# Patient Record
Sex: FEMALE | Race: OTHER | URBAN - METROPOLITAN AREA
[De-identification: names, ages, dates, MRNs, and addresses within clinical notes are randomized per-mention and may not be internally consistent; named-entity substitution may affect disease eponyms.]

---

## 2019-09-23 ENCOUNTER — OFFICE VISIT (OUTPATIENT)
Dept: GYNECOLOGY | Facility: HOSPITAL | Age: 34
End: 2019-09-23
Attending: OBSTETRICS & GYNECOLOGY
Payer: COMMERCIAL

## 2019-09-23 VITALS
DIASTOLIC BLOOD PRESSURE: 80 MMHG | HEIGHT: 63 IN | WEIGHT: 204.4 LBS | RESPIRATION RATE: 18 BRPM | HEART RATE: 82 BPM | SYSTOLIC BLOOD PRESSURE: 142 MMHG | BODY MASS INDEX: 36.21 KG/M2

## 2019-09-23 DIAGNOSIS — Z11.3 SCREENING EXAMINATION FOR SEXUALLY TRANSMITTED DISEASE: ICD-10-CM

## 2019-09-23 DIAGNOSIS — Z01.411 ENCOUNTER FOR GYNECOLOGICAL EXAMINATION (GENERAL) (ROUTINE) WITH ABNORMAL FINDINGS: Primary | ICD-10-CM

## 2019-09-23 DIAGNOSIS — Z30.013 INITIATION OF DEPO PROVERA: ICD-10-CM

## 2019-09-23 LAB
B-HCG UR QL: NEGATIVE
POCT TEST: NORMAL

## 2019-09-23 PROCEDURE — G0463 HOSPITAL OUTPT CLINIC VISIT: HCPCS | Mod: 25,FP | Performed by: ADVANCED PRACTICE MIDWIFE

## 2019-09-23 PROCEDURE — 81025 URINE PREGNANCY TEST: CPT | Performed by: OBSTETRICS & GYNECOLOGY

## 2019-09-23 PROCEDURE — 96372 THER/PROPH/DIAG INJ SC/IM: CPT | Performed by: ADVANCED PRACTICE MIDWIFE

## 2019-09-23 PROCEDURE — G0463 HOSPITAL OUTPT CLINIC VISIT: HCPCS

## 2019-09-23 PROCEDURE — 96372 THER/PROPH/DIAG INJ SC/IM: CPT | Performed by: OBSTETRICS & GYNECOLOGY

## 2019-09-23 PROCEDURE — 3E023GC INTRODUCTION OF OTHER THERAPEUTIC SUBSTANCE INTO MUSCLE, PERCUTANEOUS APPROACH: ICD-10-PCS | Performed by: ADVANCED PRACTICE MIDWIFE

## 2019-09-23 PROCEDURE — G0463 HOSPITAL OUTPT CLINIC VISIT: HCPCS | Mod: 25 | Performed by: ADVANCED PRACTICE MIDWIFE

## 2019-09-23 PROCEDURE — 8E0UXY7 EXAMINATION OF FEMALE REPRODUCTIVE SYSTEM: ICD-10-PCS | Performed by: ADVANCED PRACTICE MIDWIFE

## 2019-09-23 RX ORDER — FLUTICASONE PROPIONATE 50 MCG
1 SPRAY, SUSPENSION (ML) NASAL DAILY PRN
COMMUNITY
Start: 2018-09-28

## 2019-09-23 RX ORDER — ALBUTEROL SULFATE 90 UG/1
2 INHALANT RESPIRATORY (INHALATION) AS NEEDED
COMMUNITY
Start: 2019-02-27

## 2019-09-23 RX ORDER — MEDROXYPROGESTERONE ACETATE 150 MG/ML
150 INJECTION, SUSPENSION INTRAMUSCULAR ONCE
Status: COMPLETED | OUTPATIENT
Start: 2019-09-23 | End: 2019-09-23

## 2019-09-23 RX ORDER — SUMATRIPTAN SUCCINATE 50 MG/1
50 TABLET ORAL ONCE AS NEEDED
COMMUNITY
Start: 2019-01-16

## 2019-09-23 RX ORDER — ONDANSETRON 4 MG/1
4 TABLET, ORALLY DISINTEGRATING ORAL AS NEEDED
COMMUNITY
Start: 2019-01-16 | End: 2022-03-21 | Stop reason: ALTCHOICE

## 2019-09-23 RX ADMIN — MEDROXYPROGESTERONE ACETATE 150 MG: 150 INJECTION, SUSPENSION INTRAMUSCULAR at 15:32

## 2019-09-23 NOTE — PROGRESS NOTES
34 y.o. yo   here for annual visit and STD testing.  Not sexually active for past month and would like to start Depo, UPT today is negative.Currently exercising twp times weekly.  Admists to feeling very stressed, has very little support and is unemployed, also has a history of housing insecurity.  Agrees to speak with SW today.    Obstetric History:   OB History    Para Term  AB Living   9       4 5   SAB TAB Ectopic Multiple Live Births   2 2            # Outcome Date GA Lbr Moy/2nd Weight Sex Delivery Anes PTL Lv   9             8             7             6             5             4 SAB            3 SAB            2 TAB            1 TAB                 Sexualy History:   History   Sexual Activity   • Sexual activity: Not Currently     Menstrual History: GYN Status:  Last Menstrual Period: N/A  Menstrual Status: N/A  LMP Comment: N/A  Menarche Age:        Past Medical History:  has a past medical history of Asthma; Migraines; and Seasonal allergies.  Past Surgical History:  has a past surgical history that includes laparotomy exploratory ().  Family History: family history includes COPD in her biological father; Diabetes in her biological mother; Heart disease in her biological father.  Social History:  reports that she has been smoking.  She has never used smokeless tobacco. She reports that she drinks about 1.8 oz of alcohol per week . She reports that she uses drugs, including Marijuana.  Medications:   Current Outpatient Prescriptions:   •  albuterol HFA (PROVENTIL HFA) 90 mcg/actuation inhaler, Inhale 2 puffs., Disp: , Rfl:   •  cetirizine (ZyrTEC) 10 mg capsule, Take 10 mg by mouth daily., Disp: , Rfl:   •  fluticasone propionate (FLONASE) 50 mcg/actuation nasal spray, 1 spray daily., Disp: , Rfl:   •  ondansetron ODT (ZOFRAN ODT) 4 mg disintegrating tablet, Take 4 mg by mouth., Disp: , Rfl:   •  SUMAtriptan (IMITREX) 50 mg tablet, Take one tab  PO at onset of headache. Repeat dose in 2 hours if headache not resolved, Disp: , Rfl:     Current Facility-Administered Medications:   •  medroxyPROGESTERone (DEPO-PROVERA) injection 150 mg, 150 mg, intramuscular, Once, Debby Saunders CNM    Allergies: is allergic to latex.     Review of Systems  Constitutional: Denies chills and fever.   GI:   Denies abdomen pain, distention or change in bowel habits.  :   Denies dyspareunia, dysuria, pain, vag bleeding or discharge.   Breast:   Denies breast discharge, breast mass and breast pain.       Physical Exam   Constitutional:  Well developed.   Head:    Normocephalic.   Neck:   Thyroid normal, normal palpation.  Cardiovascular:  Normal rate and regular rhythm.    Pulmonary/Chest:  Breath sounds normal bilaterally.  Abdominal:   Soft, non-tender.   Breast Exam:  Inspection, palpation and nipples normal, bilaterally.    Ext Gen:   Normal hair distribution, normal urethra, clitoris and labia.  Vagina:  Normal orifice, pink epithelium, physiologic secretions.  Healed folliculitis scars.  Cervix:   Mispositioned, os closed.  Uterus:  Normal.  Adnexa:   Non tender.  Rectal:   Deferred.  Extremities:  No edema.     Impression:  Normal well-woman visit.     STD testing.     Depo start     Stress    Plan:  · Healthy lifestyle encouraged including SBE and regular exercise.  · Encouarged to cut back on toxic habits such as smoking and drinking.  · GC/CT collected today, to lab for HIV and RPR.   · Pap collected today.  · Depo administered by RN.  · RTO in three months for next Depo.

## 2019-09-23 NOTE — PROGRESS NOTES
09/23/2019 1535: SW met with patient during obgyn visit. Patient stated she is very stressed. Patient stated she  Has a 16, 12, 10, 8 and 3 year old. Patient stated she has had much difficulty caring for children as they has been involved in legal issues. Patient stated her 12 year old is repeatedly in trouble at school. Patient stated 10 year old keeps running away from home and has been in and out of assisted centers. Patient stated her 8 year olds FOB is now incarcerated again and she is supported at all. Patient stated she worked at [employer] and is now on [employer]. Patient stated she has started borrowing money from family to help pay for bills. SW provided patient information on WIC as she has a 3 year old. Patient stated she tries to soothe herself by taking bathes and lighting candles. Patient does not feel that counseling with help her at this point. SW will remain available. Raquel Serrano,[facility] [place] [phone]

## 2019-09-24 LAB
C TRACH RRNA SPEC QL NAA+PROBE: NOT DETECTED
N GONORRHOEA RRNA SPEC QL NAA+PROBE: NOT DETECTED
QST (ALWAYS MESSAGE): NORMAL

## 2019-09-25 LAB
HIV 1+2 AB+HIV1 P24 AG SERPL QL IA: (no result)
T PALLIDUM AB SER QL IA: NEGATIVE

## 2019-09-27 LAB
CLINICAL INFO: NORMAL
CYTO CVX: NORMAL
CYTOLOGIST CVX/VAG CYTO: NORMAL
CYTOLOGY CMNT CVX/VAG CYTO-IMP: NORMAL
DATE OF PREVIOUS PAP: NEGATIVE
DATE PREVIOUS BX: NORMAL
HPV E6+E7 MRNA CVX QL NAA+PROBE: NOT DETECTED
LMP START DATE: NORMAL
QUEST COMMENT (PAP): NORMAL
SPECIMEN SOURCE CVX/VAG CYTO: NORMAL
STAT OF ADQ CVX/VAG CYTO-IMP: NORMAL

## 2019-12-20 ENCOUNTER — OFFICE VISIT (OUTPATIENT)
Dept: GYNECOLOGY | Facility: HOSPITAL | Age: 34
End: 2019-12-20
Attending: OBSTETRICS & GYNECOLOGY
Payer: COMMERCIAL

## 2019-12-20 VITALS
BODY MASS INDEX: 33.22 KG/M2 | SYSTOLIC BLOOD PRESSURE: 133 MMHG | WEIGHT: 187.5 LBS | HEART RATE: 91 BPM | HEIGHT: 63 IN | DIASTOLIC BLOOD PRESSURE: 70 MMHG

## 2019-12-20 DIAGNOSIS — Z20.2 POSSIBLE EXPOSURE TO STD: Primary | ICD-10-CM

## 2019-12-20 LAB
B-HCG UR QL: NEGATIVE
BILIRUB UR QL STRIP.AUTO: ABNORMAL MG/DL
CLARITY UR REFRACT.AUTO: CLEAR
COLOR UR AUTO: YELLOW
GLUCOSE UR STRIP.AUTO-MCNC: NEGATIVE MG/DL
HGB UR QL STRIP.AUTO: NEGATIVE
KETONES UR STRIP.AUTO-MCNC: ABNORMAL MG/DL
LEUKOCYTE ESTERASE UR QL STRIP.AUTO: NEGATIVE
NITRITE UR QL STRIP.AUTO: NEGATIVE
PH UR STRIP.AUTO: 5.5 [PH]
POCT TEST (UMAC): ABNORMAL
POCT TEST: NORMAL
PROT UR QL STRIP.AUTO: ABNORMAL
SP GR UR REFRACT.AUTO: >=1.03
UROBILINOGEN UR STRIP-ACNC: 0.2 EU/DL

## 2019-12-20 PROCEDURE — 8E0UXY7 EXAMINATION OF FEMALE REPRODUCTIVE SYSTEM: ICD-10-PCS | Performed by: OBSTETRICS & GYNECOLOGY

## 2019-12-20 PROCEDURE — G0463 HOSPITAL OUTPT CLINIC VISIT: HCPCS | Mod: 25 | Performed by: OBSTETRICS & GYNECOLOGY

## 2019-12-20 PROCEDURE — G0463 HOSPITAL OUTPT CLINIC VISIT: HCPCS | Mod: FP | Performed by: OBSTETRICS & GYNECOLOGY

## 2019-12-20 PROCEDURE — 96372 THER/PROPH/DIAG INJ SC/IM: CPT | Performed by: OBSTETRICS & GYNECOLOGY

## 2019-12-20 PROCEDURE — 96372 THER/PROPH/DIAG INJ SC/IM: CPT | Mod: FP | Performed by: OBSTETRICS & GYNECOLOGY

## 2019-12-20 PROCEDURE — 3E02329 INTRODUCTION OF OTHER ANTI-INFECTIVE INTO MUSCLE, PERCUTANEOUS APPROACH: ICD-10-PCS | Performed by: OBSTETRICS & GYNECOLOGY

## 2019-12-20 PROCEDURE — G0463 HOSPITAL OUTPT CLINIC VISIT: HCPCS

## 2019-12-20 RX ORDER — CEFTRIAXONE 250 MG/1
250 INJECTION, POWDER, FOR SOLUTION INTRAMUSCULAR; INTRAVENOUS
Status: DISCONTINUED | OUTPATIENT
Start: 2019-12-20 | End: 2021-06-11 | Stop reason: ENTERED-IN-ERROR

## 2019-12-20 RX ORDER — DOXYCYCLINE HYCLATE 100 MG
100 TABLET ORAL 2 TIMES DAILY
Qty: 28 TABLET | Refills: 0 | Status: SHIPPED | OUTPATIENT
Start: 2019-12-20 | End: 2020-01-03

## 2019-12-20 RX ADMIN — CEFTRIAXONE 250 MG: 250 INJECTION, POWDER, FOR SOLUTION INTRAMUSCULAR; INTRAVENOUS at 16:29

## 2019-12-20 ASSESSMENT — PAIN SCALES - GENERAL: PAINLEVEL: 6

## 2019-12-22 PROBLEM — Z20.2 POSSIBLE EXPOSURE TO STD: Status: ACTIVE | Noted: 2019-12-22

## 2019-12-23 NOTE — ASSESSMENT & PLAN NOTE
-- Minimal vaginal discharge.  -- Sureswab collected and sent.   -- No CMT, unlikely to be PID.  -- Will empirically treat with Ceftriaxone and Doxycycline  -- Lab slip given for HIV and Syphilis testing  -- Pt to follow up in 3-6 months for repeat HIV testing given latency period of testing.

## 2019-12-23 NOTE — PROGRESS NOTES
Patient ID: Chrissy Tomlinson   : 1985 34 y.o.  MRN: [id number]   Visit Date: 2019    Subjective   Chrissy Tomlinson is presenting today for STI/STD Screening      The patient is a 35yo  who reports recent possible STI exposure. The patient reports that she found paperwork for STI testing in the name of her partner, who was recently incarcerated. That testing had not resulted. The patient reports intermittent abdominal pain and pelvic pain. She also reports N/V. Denies diarrhea, dysuria, or constipation. She reports occasional abnormal vaginal discharge. She denies F/C.      Past Medical History:  has a past medical history of Asthma, Migraines, and Seasonal allergies.     Past Surgical History:  has a past surgical history that includes laparotomy exploratory ().    Obstetric History:   OB History    Para Term  AB Living   9 5     4 5   SAB TAB Ectopic Multiple Live Births   2 2            # Outcome Date GA Lbr Moy/2nd Weight Sex Delivery Anes PTL Lv   9 Para            8 Para            7 Para            6 Para            5 Para            4 SAB            3 SAB            2 TAB            1 TAB                Gynecologic History:    Medications:   Current Outpatient Medications:   •  albuterol HFA (PROVENTIL HFA) 90 mcg/actuation inhaler, Inhale 2 puffs., Disp: , Rfl:   •  cetirizine (ZyrTEC) 10 mg capsule, Take 10 mg by mouth daily., Disp: , Rfl:   •  fluticasone propionate (FLONASE) 50 mcg/actuation nasal spray, 1 spray daily., Disp: , Rfl:   •  SUMAtriptan (IMITREX) 50 mg tablet, Take one tab PO at onset of headache. Repeat dose in 2 hours if headache not resolved, Disp: , Rfl:   •  doxycycline hyclate (VIBRA-TABS) 100 mg tablet, Take 1 tablet (100 mg total) by mouth 2 (two) times a day for 14 days., Disp: 28 tablet, Rfl: 0  •  ondansetron ODT (ZOFRAN ODT) 4 mg disintegrating tablet, Take 4 mg by mouth., Disp: , Rfl:     Current Facility-Administered Medications:   •   "cefTRIAXone (ROCEPHIN) injection 250 mg, 250 mg, intramuscular, q24h INT, Ulysses Lei MD, 250 mg at 12/20/19 3422      Allergies: is allergic to latex.       Vital Signs for this encounter:   Visit Vitals  /70 (BP Location: Left upper arm, Patient Position: Sitting)   Pulse 91   Ht 1.6 m (5' 3\")   Wt 85 kg (187 lb 8 oz)   LMP 08/19/2019   BMI 33.21 kg/m²       Physical Exam   Constitutional: She appears well-developed and well-nourished.   Genitourinary: Uterus normal.   External female genitalia normal. No tenderness or bleeding in the vagina. Vaginal discharge (minimal thick tan discharge) found.   Cervix exam normal.  Cervix exhibits pinkness. Cervix does not exhibit motion tenderness or discharge.   Uterus is normal. Uterus is mobile. Uterus is not enlarged or irregular.   Adnexa normal. Right adnexum does not display mass. Left adnexum does not display mass.   Abdominal: Soft. She exhibits no distension. There is no tenderness.   Neurological: She is alert.   Skin: Skin is warm.   Psychiatric: She has a normal mood and affect.     Lab Results   Component Value Date    HCGPREGUR Negative 12/20/2019      Impression/Plan:    34 y.o. is presenting today for STI/STD Screening      Problem List Items Addressed This Visit        Other    Possible exposure to STD - Primary    Current Assessment & Plan     -- Minimal vaginal discharge.  -- Sureswab collected and sent.   -- No CMT, unlikely to be PID.  -- Will empirically treat with Ceftriaxone and Doxycycline  -- Lab slip given for HIV and Syphilis testing  -- Pt to follow up in 3-6 months for repeat HIV testing given latency period of testing.         Relevant Medications    cefTRIAXone (ROCEPHIN) injection 250 mg    doxycycline hyclate (VIBRA-TABS) 100 mg tablet    Other Relevant Orders    SURESWAB(R), VAGINOSIS/VAGINITIS PLUS    HIV 1,2 AB P24 AG    Syphilis Antibodies          D/W Dr. SHANNON Lei MD PGY1  Obstetrics and Gynecology  Beeper " #[id number]

## 2019-12-25 LAB
BV SCORE VAG QL NAA+PROBE: ABNORMAL
C GLABRATA DNA VAG QL NAA+PROBE: NOT DETECTED
C PARAP DNA VAG QL NAA+PROBE: NOT DETECTED
C TRACH RRNA SPEC QL NAA+PROBE: NOT DETECTED
C TROPICLS DNA VAG QL NAA+PROBE: NOT DETECTED
CANDIDA DNA VAG QL NAA+PROBE: NOT DETECTED
G VAGINALIS DNA VAG NAA+PROBE-LOG#: >8 LOG (CELLS/ML)
HIV 1+2 AB+HIV1 P24 AG SERPL QL IA: NORMAL
MEGASPHAERA SP DNA VAG NAA+PROBE-LOG#: 7.2 LOG (CELLS/ML)
N GONORRHOEA RRNA SPEC QL NAA+PROBE: NOT DETECTED
QUEST ATOPOBIUM VAGINAE: 6.3 LOG (CELLS/ML)
QUEST LACTOBACILLUS SPECIES: NOT DETECTED
T PALLIDUM AB SER QL IA: NEGATIVE
T VAGINALIS RRNA SPEC QL NAA+PROBE: NOT DETECTED

## 2019-12-26 ENCOUNTER — TELEPHONE (OUTPATIENT)
Dept: OBSTETRICS AND GYNECOLOGY | Facility: HOSPITAL | Age: 34
End: 2019-12-26

## 2019-12-26 DIAGNOSIS — B96.89 BV (BACTERIAL VAGINOSIS): Primary | ICD-10-CM

## 2019-12-26 DIAGNOSIS — N76.0 BV (BACTERIAL VAGINOSIS): Primary | ICD-10-CM

## 2019-12-26 RX ORDER — METRONIDAZOLE 500 MG/1
500 TABLET ORAL 2 TIMES DAILY
Qty: 14 TABLET | Refills: 0 | Status: SHIPPED | OUTPATIENT
Start: 2019-12-26 | End: 2020-01-02

## 2019-12-27 ENCOUNTER — TELEPHONE (OUTPATIENT)
Dept: OBSTETRICS AND GYNECOLOGY | Facility: HOSPITAL | Age: 34
End: 2019-12-27

## 2019-12-27 DIAGNOSIS — Z32.00 POSSIBLE PREGNANCY: Primary | ICD-10-CM

## 2019-12-27 NOTE — TELEPHONE ENCOUNTER
Called patient to let her know about +BV and flagyl prescription.    Secondarily, Pt insists that she has had negative pregnancy tests but has subjective pregnancy.  When asked to further qualify these feelings the patient would not elaborate.  The patient insists that she was not diagnosed with a previous pregnancy until she was 5-1/2 months and does not trust urine pregnancy test.  I explained to the patient that I do not see any indication for blood testing, however she insisted.  As result, I have ordered a quantitative beta-hCG that the patient can complete at her convenience.    Ulysses Lei MD PGY1  Obstetrics and Gynecology  Beeper #[id number]

## 2020-01-17 ENCOUNTER — TELEPHONE (OUTPATIENT)
Dept: OBSTETRICS AND GYNECOLOGY | Facility: HOSPITAL | Age: 35
End: 2020-01-17

## 2020-01-17 NOTE — TELEPHONE ENCOUNTER
"Patient calls with c/o abdominal pain and \"bumps near vagina\" explained to patient that the first availbale appt in 1/30/20 patient took appt but was unhappy she had to wait so long. I gave patient phone # to access matters to see if she could be seen sooner. Patient was unsure if she would call siddhartha or just go \"thru the ER\"  "

## 2020-01-18 LAB — B-HCG SERPL-ACNC: <2 MIU/ML

## 2020-01-30 ENCOUNTER — OFFICE VISIT (OUTPATIENT)
Dept: GYNECOLOGY | Facility: HOSPITAL | Age: 35
End: 2020-01-30
Attending: OBSTETRICS & GYNECOLOGY
Payer: COMMERCIAL

## 2020-01-30 VITALS
WEIGHT: 182.4 LBS | HEIGHT: 63 IN | SYSTOLIC BLOOD PRESSURE: 131 MMHG | HEART RATE: 80 BPM | BODY MASS INDEX: 32.32 KG/M2 | DIASTOLIC BLOOD PRESSURE: 101 MMHG

## 2020-01-30 DIAGNOSIS — Z30.013 INITIATION OF DEPO PROVERA: Primary | ICD-10-CM

## 2020-01-30 DIAGNOSIS — F43.9 STRESS AT HOME: ICD-10-CM

## 2020-01-30 DIAGNOSIS — R10.9 ABDOMINAL PAIN, UNSPECIFIED ABDOMINAL LOCATION: ICD-10-CM

## 2020-01-30 DIAGNOSIS — L70.0 ACNE COMEDONE: ICD-10-CM

## 2020-01-30 PROCEDURE — G0463 HOSPITAL OUTPT CLINIC VISIT: HCPCS

## 2020-01-30 PROCEDURE — G0463 HOSPITAL OUTPT CLINIC VISIT: HCPCS | Mod: 25,FP | Performed by: ADVANCED PRACTICE MIDWIFE

## 2020-01-30 PROCEDURE — 96372 THER/PROPH/DIAG INJ SC/IM: CPT | Performed by: OBSTETRICS & GYNECOLOGY

## 2020-01-30 PROCEDURE — G0463 HOSPITAL OUTPT CLINIC VISIT: HCPCS | Performed by: ADVANCED PRACTICE MIDWIFE

## 2020-01-30 PROCEDURE — 96372 THER/PROPH/DIAG INJ SC/IM: CPT | Mod: FP | Performed by: ADVANCED PRACTICE MIDWIFE

## 2020-01-30 PROCEDURE — 3E023GC INTRODUCTION OF OTHER THERAPEUTIC SUBSTANCE INTO MUSCLE, PERCUTANEOUS APPROACH: ICD-10-PCS | Performed by: ADVANCED PRACTICE MIDWIFE

## 2020-01-30 RX ORDER — MEDROXYPROGESTERONE ACETATE 150 MG/ML
150 INJECTION, SUSPENSION INTRAMUSCULAR ONCE
Status: COMPLETED | OUTPATIENT
Start: 2020-01-30 | End: 2020-01-30

## 2020-01-30 RX ADMIN — MEDROXYPROGESTERONE ACETATE 150 MG: 150 INJECTION, SUSPENSION INTRAMUSCULAR at 12:16

## 2020-01-30 ASSESSMENT — PAIN SCALES - GENERAL: PAINLEVEL: 10-WORST PAIN EVER

## 2020-01-30 NOTE — PROGRESS NOTES
"35 yo who is here for \"bumps on her thighs\" for the past several months.  Is nervous because her partner who is currently incarcerated may have other partners.  Had a negative STD testing 12/20/19 and has not been sexually active since then.  Admits to frequently picking at her thighs when she notices a new bump.      Also states that she has a stomach ache since early November.  Admits to little appetite, diarrhea and much stress in her personal life.  Does not sleep well at night due to worry.    Requesting to restart Depo, has been 18+ weeks since her last injection (9/23/19).  Has not had sex since her negative BHCG of 1/18/20.    Ext Gen:   Normal hair distribution, normal urethra, clitoris and labia.  Vagina:  Normal orifice, pink epithelium, physiologic secretions.  Cervix:   Mispositioned, os closed.  Uterus:  Normal.  Adnexa:   Non tender.  Rectal:   Deferred.  Thighs:  Several 1-2 mm comodones noted on both inner thighs, non-tender soft to palpation.     Imp:   Comedones     Stress     Abdominal pain     Depo restart       Plan:  · Hot soaks to thighs, strongly encouraged to stop picking, shorts to separate thighs.  · To PCP regarding GI symptoms.  · Advised to schedule with Dr. Luna regarding stress.  · Depo administered by RN.  · RTO in three months for Depo refill.      "

## 2020-03-05 ENCOUNTER — APPOINTMENT (RX ONLY)
Dept: URBAN - METROPOLITAN AREA CLINIC 28 | Facility: CLINIC | Age: 35
Setting detail: DERMATOLOGY
End: 2020-03-05

## 2020-03-05 DIAGNOSIS — L85.3 XEROSIS CUTIS: ICD-10-CM

## 2020-03-05 DIAGNOSIS — L73.2 HIDRADENITIS SUPPURATIVA: ICD-10-CM | Status: IMPROVED

## 2020-03-05 PROCEDURE — ? COUNSELING

## 2020-03-05 PROCEDURE — ? PRESCRIPTION

## 2020-03-05 PROCEDURE — 99213 OFFICE O/P EST LOW 20 MIN: CPT

## 2020-03-05 PROCEDURE — ? PRESCRIPTION MEDICATION MANAGEMENT

## 2020-03-05 RX ORDER — AMMONIUM LACTATE 120 MG/G
CREAM TOPICAL QDAY
Qty: 1 | Refills: 3 | Status: ERX | COMMUNITY
Start: 2020-03-05

## 2020-03-05 RX ORDER — TRIAMCINOLONE ACETONIDE 1 MG/G
CREAM TOPICAL BID
Qty: 1 | Refills: 2 | Status: ERX | COMMUNITY
Start: 2020-03-05

## 2020-03-05 RX ADMIN — TRIAMCINOLONE ACETONIDE: 1 CREAM TOPICAL at 00:00

## 2020-03-05 RX ADMIN — AMMONIUM LACTATE: 120 CREAM TOPICAL at 00:00

## 2020-03-05 ASSESSMENT — LOCATION SIMPLE DESCRIPTION DERM
LOCATION SIMPLE: RIGHT AXILLARY VAULT
LOCATION SIMPLE: LEFT UPPER ARM
LOCATION SIMPLE: LEFT AXILLARY VAULT
LOCATION SIMPLE: RIGHT THIGH
LOCATION SIMPLE: RIGHT FOREARM
LOCATION SIMPLE: ABDOMEN
LOCATION SIMPLE: LEFT THIGH

## 2020-03-05 ASSESSMENT — LOCATION DETAILED DESCRIPTION DERM
LOCATION DETAILED: LEFT ANTERIOR PROXIMAL THIGH
LOCATION DETAILED: LEFT ANTERIOR DISTAL UPPER ARM
LOCATION DETAILED: RIGHT VENTRAL PROXIMAL FOREARM
LOCATION DETAILED: RIGHT ANTERIOR DISTAL THIGH
LOCATION DETAILED: LEFT AXILLARY VAULT
LOCATION DETAILED: RIGHT ANTERIOR PROXIMAL THIGH
LOCATION DETAILED: EPIGASTRIC SKIN
LOCATION DETAILED: RIGHT AXILLARY VAULT

## 2020-03-05 ASSESSMENT — LOCATION ZONE DERM
LOCATION ZONE: AXILLAE
LOCATION ZONE: LEG
LOCATION ZONE: TRUNK
LOCATION ZONE: ARM

## 2020-03-05 NOTE — PROCEDURE: PRESCRIPTION MEDICATION MANAGEMENT
Render In Strict Bullet Format?: No
Detail Level: Zone
Continue Regimen: benzoyl peroxide 10% topical cleanser: Wash AA of body QDAY during shower\\nclindamycin phosphate 1% topical swab: Wipe AA of body after shower QDAY\\ndoxycycline monohydrate 100mg capsule: Take one capsule PO QDAY with food
Initiate Treatment: triamcinolone 0.1% topical cream: apply to dark marks of legs and body BID x 2 weeks
Initiate Treatment: ammonium lactate 12 % topical cream: Apply to affected area of body after shower qday

## 2020-06-15 ENCOUNTER — APPOINTMENT (RX ONLY)
Dept: URBAN - METROPOLITAN AREA CLINIC 28 | Facility: CLINIC | Age: 35
Setting detail: DERMATOLOGY
End: 2020-06-15

## 2020-06-15 DIAGNOSIS — L85.3 XEROSIS CUTIS: ICD-10-CM | Status: WELL CONTROLLED

## 2020-06-15 DIAGNOSIS — L73.2 HIDRADENITIS SUPPURATIVA: ICD-10-CM | Status: IMPROVED

## 2020-06-15 DIAGNOSIS — L70.0 ACNE VULGARIS: ICD-10-CM

## 2020-06-15 PROCEDURE — ? COUNSELING

## 2020-06-15 PROCEDURE — ? PRESCRIPTION

## 2020-06-15 PROCEDURE — ? PRESCRIPTION MEDICATION MANAGEMENT

## 2020-06-15 PROCEDURE — 99214 OFFICE O/P EST MOD 30 MIN: CPT

## 2020-06-15 RX ORDER — BENZOYL PEROXIDE 100 MG/ML
1 LIQUID TOPICAL DAILY
Qty: 1 | Refills: 3 | Status: ERX | COMMUNITY
Start: 2020-06-15

## 2020-06-15 RX ORDER — CLINDAMYCIN PHOSPHATE 10 MG/ML
1 SOLUTION TOPICAL QDAY
Qty: 1 | Refills: 3 | Status: ERX | COMMUNITY
Start: 2020-06-15

## 2020-06-15 RX ADMIN — CLINDAMYCIN PHOSPHATE 1: 10 SOLUTION TOPICAL at 00:00

## 2020-06-15 RX ADMIN — BENZOYL PEROXIDE 1: 100 LIQUID TOPICAL at 00:00

## 2020-06-15 ASSESSMENT — LOCATION SIMPLE DESCRIPTION DERM
LOCATION SIMPLE: LEFT CHEEK
LOCATION SIMPLE: RIGHT SHOULDER
LOCATION SIMPLE: LEFT AXILLARY VAULT
LOCATION SIMPLE: LEFT THIGH
LOCATION SIMPLE: ABDOMEN
LOCATION SIMPLE: RIGHT FOREARM
LOCATION SIMPLE: RIGHT THIGH
LOCATION SIMPLE: RIGHT CHEEK
LOCATION SIMPLE: RIGHT AXILLARY VAULT
LOCATION SIMPLE: LEFT SHOULDER
LOCATION SIMPLE: LEFT UPPER ARM

## 2020-06-15 ASSESSMENT — LOCATION DETAILED DESCRIPTION DERM
LOCATION DETAILED: RIGHT VENTRAL PROXIMAL FOREARM
LOCATION DETAILED: RIGHT AXILLARY VAULT
LOCATION DETAILED: LEFT ANTERIOR PROXIMAL THIGH
LOCATION DETAILED: LEFT POSTERIOR SHOULDER
LOCATION DETAILED: RIGHT POSTERIOR SHOULDER
LOCATION DETAILED: RIGHT ANTERIOR PROXIMAL THIGH
LOCATION DETAILED: RIGHT ANTERIOR DISTAL THIGH
LOCATION DETAILED: LEFT AXILLARY VAULT
LOCATION DETAILED: LEFT INFERIOR CENTRAL MALAR CHEEK
LOCATION DETAILED: LEFT ANTERIOR DISTAL UPPER ARM
LOCATION DETAILED: RIGHT CENTRAL BUCCAL CHEEK
LOCATION DETAILED: EPIGASTRIC SKIN

## 2020-06-15 ASSESSMENT — LOCATION ZONE DERM
LOCATION ZONE: AXILLAE
LOCATION ZONE: FACE
LOCATION ZONE: LEG
LOCATION ZONE: ARM
LOCATION ZONE: TRUNK

## 2020-06-15 NOTE — PROCEDURE: COUNSELING
Detail Level: Detailed
Erythromycin Pregnancy And Lactation Text: This medication is Pregnancy Category B and is considered safe during pregnancy. It is also excreted in breast milk.
Tazorac Pregnancy And Lactation Text: This medication is not safe during pregnancy. It is unknown if this medication is excreted in breast milk.
Dapsone Pregnancy And Lactation Text: This medication is Pregnancy Category C and is not considered safe during pregnancy or breast feeding.
Bactrim Counseling:  I discussed with the patient the risks of sulfa antibiotics including but not limited to GI upset, allergic reaction, drug rash, diarrhea, dizziness, photosensitivity, and yeast infections.  Rarely, more serious reactions can occur including but not limited to aplastic anemia, agranulocytosis, methemoglobinemia, blood dyscrasias, liver or kidney failure, lung infiltrates or desquamative/blistering drug rashes.
Topical Sulfur Applications Pregnancy And Lactation Text: This medication is Pregnancy Category C and has an unknown safety profile during pregnancy. It is unknown if this topical medication is excreted in breast milk.
Dapsone Counseling: I discussed with the patient the risks of dapsone including but not limited to hemolytic anemia, agranulocytosis, rashes, methemoglobinemia, kidney failure, peripheral neuropathy, headaches, GI upset, and liver toxicity.  Patients who start dapsone require monitoring including baseline LFTs and weekly CBCs for the first month, then every month thereafter.  The patient verbalized understanding of the proper use and possible adverse effects of dapsone.  All of the patient's questions and concerns were addressed.
Benzoyl Peroxide Counseling: Patient counseled that medicine may cause skin irritation and bleach clothing.  In the event of skin irritation, the patient was advised to reduce the amount of the drug applied or use it less frequently.   The patient verbalized understanding of the proper use and possible adverse effects of benzoyl peroxide.  All of the patient's questions and concerns were addressed.
Include Pregnancy/Lactation Warning?: No
High Dose Vitamin A Pregnancy And Lactation Text: High dose vitamin A therapy is contraindicated during pregnancy and breast feeding.
Spironolactone Counseling: Patient advised regarding risks of diarrhea, abdominal pain, hyperkalemia, birth defects (for female patients), liver toxicity and renal toxicity. The patient may need blood work to monitor liver and kidney function and potassium levels while on therapy. The patient verbalized understanding of the proper use and possible adverse effects of spironolactone.  All of the patient's questions and concerns were addressed.
Topical Clindamycin Counseling: Patient counseled that this medication may cause skin irritation or allergic reactions.  In the event of skin irritation, the patient was advised to reduce the amount of the drug applied or use it less frequently.   The patient verbalized understanding of the proper use and possible adverse effects of clindamycin.  All of the patient's questions and concerns were addressed.
Bactrim Pregnancy And Lactation Text: This medication is Pregnancy Category D and is known to cause fetal risk.  It is also excreted in breast milk.
Detail Level: Simple
Spironolactone Pregnancy And Lactation Text: This medication can cause feminization of the male fetus and should be avoided during pregnancy. The active metabolite is also found in breast milk.
Topical Retinoid counseling:  Patient advised to apply a pea-sized amount only at bedtime and wait 30 minutes after washing their face before applying.  If too drying, patient may add a non-comedogenic moisturizer. The patient verbalized understanding of the proper use and possible adverse effects of retinoids.  All of the patient's questions and concerns were addressed.
Benzoyl Peroxide Pregnancy And Lactation Text: This medication is Pregnancy Category C. It is unknown if benzoyl peroxide is excreted in breast milk.
Minocycline Counseling: Patient advised regarding possible photosensitivity and discoloration of the teeth, skin, lips, tongue and gums.  Patient instructed to avoid sunlight, if possible.  When exposed to sunlight, patients should wear protective clothing, sunglasses, and sunscreen.  The patient was instructed to call the office immediately if the following severe adverse effects occur:  hearing changes, easy bruising/bleeding, severe headache, or vision changes.  The patient verbalized understanding of the proper use and possible adverse effects of minocycline.  All of the patient's questions and concerns were addressed.
Isotretinoin Counseling: Patient should get monthly blood tests, not donate blood, not drive at night if vision affected, not share medication, and not undergo elective surgery for 6 months after tx completed. Side effects reviewed, pt to contact office should one occur.
Tetracycline Counseling: Patient counseled regarding possible photosensitivity and increased risk for sunburn.  Patient instructed to avoid sunlight, if possible.  When exposed to sunlight, patients should wear protective clothing, sunglasses, and sunscreen.  The patient was instructed to call the office immediately if the following severe adverse effects occur:  hearing changes, easy bruising/bleeding, severe headache, or vision changes.  The patient verbalized understanding of the proper use and possible adverse effects of tetracycline.  All of the patient's questions and concerns were addressed. Patient understands to avoid pregnancy while on therapy due to potential birth defects.
Topical Retinoid Pregnancy And Lactation Text: This medication is Pregnancy Category C. It is unknown if this medication is excreted in breast milk.
Doxycycline Pregnancy And Lactation Text: This medication is Pregnancy Category D and not consider safe during pregnancy. It is also excreted in breast milk but is considered safe for shorter treatment courses.
Sarecycline Counseling: Patient advised regarding possible photosensitivity and discoloration of the teeth, skin, lips, tongue and gums.  Patient instructed to avoid sunlight, if possible.  When exposed to sunlight, patients should wear protective clothing, sunglasses, and sunscreen.  The patient was instructed to call the office immediately if the following severe adverse effects occur:  hearing changes, easy bruising/bleeding, severe headache, or vision changes.  The patient verbalized understanding of the proper use and possible adverse effects of sarecycline.  All of the patient's questions and concerns were addressed.
Azithromycin Counseling:  I discussed with the patient the risks of azithromycin including but not limited to GI upset, allergic reaction, drug rash, diarrhea, and yeast infections.
Minocycline Pregnancy And Lactation Text: This medication is Pregnancy Category D and not consider safe during pregnancy. It is also excreted in breast milk.
Doxycycline Counseling:  Patient counseled regarding possible photosensitivity and increased risk for sunburn.  Patient instructed to avoid sunlight, if possible.  When exposed to sunlight, patients should wear protective clothing, sunglasses, and sunscreen.  The patient was instructed to call the office immediately if the following severe adverse effects occur:  hearing changes, easy bruising/bleeding, severe headache, or vision changes.  The patient verbalized understanding of the proper use and possible adverse effects of doxycycline.  All of the patient's questions and concerns were addressed.
Birth Control Pills Counseling: Birth Control Pill Counseling: I discussed with the patient the potential side effects of OCPs including but not limited to increased risk of stroke, heart attack, thrombophlebitis, deep venous thrombosis, hepatic adenomas, breast changes, GI upset, headaches, and depression.  The patient verbalized understanding of the proper use and possible adverse effects of OCPs. All of the patient's questions and concerns were addressed.
Isotretinoin Pregnancy And Lactation Text: This medication is Pregnancy Category X and is considered extremely dangerous during pregnancy. It is unknown if it is excreted in breast milk.
Topical Clindamycin Pregnancy And Lactation Text: This medication is Pregnancy Category B and is considered safe during pregnancy. It is unknown if it is excreted in breast milk.
Azithromycin Pregnancy And Lactation Text: This medication is considered safe during pregnancy and is also secreted in breast milk.
Erythromycin Counseling:  I discussed with the patient the risks of erythromycin including but not limited to GI upset, allergic reaction, drug rash, diarrhea, increase in liver enzymes, and yeast infections.
Tazorac Counseling:  Patient advised that medication is irritating and drying.  Patient may need to apply sparingly and wash off after an hour before eventually leaving it on overnight.  The patient verbalized understanding of the proper use and possible adverse effects of tazorac.  All of the patient's questions and concerns were addressed.
High Dose Vitamin A Counseling: Side effects reviewed, pt to contact office should one occur.
Topical Sulfur Applications Counseling: Topical Sulfur Counseling: Patient counseled that this medication may cause skin irritation or allergic reactions.  In the event of skin irritation, the patient was advised to reduce the amount of the drug applied or use it less frequently.   The patient verbalized understanding of the proper use and possible adverse effects of topical sulfur application.  All of the patient's questions and concerns were addressed.
Birth Control Pills Pregnancy And Lactation Text: This medication should be avoided if pregnant and for the first 30 days post-partum.

## 2020-06-15 NOTE — PROCEDURE: PRESCRIPTION MEDICATION MANAGEMENT
Detail Level: Zone
Render In Strict Bullet Format?: No
Continue Regimen: benzoyl peroxide 10% topical cleanser: Wash AA of body QDAY during shower\\nclindamycin phosphate 1% topical swab: Wipe AA of body after shower QDAY\\ndoxycycline monohydrate 100mg capsule: Take one capsule PO QDAY with food\\ntriamcinolone 0.1% topical cream: apply to dark marks of legs and body BID
Continue Regimen: ammonium lactate 12 % topical cream: Apply to affected area of body after shower qday.
Continue Regimen: doxycycline monohydrate 100mg capsule: Take one capsule PO QDAY with food
Initiate Treatment: benzoyl peroxide 10% topical cleanser: Wash AA of body QDAY during shower\\nclindamycin phosphate 1% topical swab: Wipe AA of body after shower QDAY

## 2021-06-11 ENCOUNTER — APPOINTMENT (OUTPATIENT)
Dept: PREADMISSION TESTING | Facility: HOSPITAL | Age: 36
End: 2021-06-11
Attending: ORTHOPAEDIC SURGERY
Payer: COMMERCIAL

## 2021-06-11 ENCOUNTER — TRANSCRIBE ORDERS (OUTPATIENT)
Dept: LAB | Facility: HOSPITAL | Age: 36
End: 2021-06-11

## 2021-06-11 ENCOUNTER — APPOINTMENT (OUTPATIENT)
Dept: LAB | Facility: HOSPITAL | Age: 36
End: 2021-06-11
Attending: ORTHOPAEDIC SURGERY
Payer: COMMERCIAL

## 2021-06-11 ENCOUNTER — OFFICE VISIT (OUTPATIENT)
Dept: OBSTETRICS AND GYNECOLOGY | Facility: HOSPITAL | Age: 36
End: 2021-06-11
Payer: COMMERCIAL

## 2021-06-11 VITALS
TEMPERATURE: 97.5 F | WEIGHT: 193 LBS | HEART RATE: 85 BPM | BODY MASS INDEX: 34.2 KG/M2 | RESPIRATION RATE: 18 BRPM | DIASTOLIC BLOOD PRESSURE: 83 MMHG | HEIGHT: 63 IN | OXYGEN SATURATION: 96 % | SYSTOLIC BLOOD PRESSURE: 125 MMHG

## 2021-06-11 VITALS
DIASTOLIC BLOOD PRESSURE: 79 MMHG | WEIGHT: 193.8 LBS | HEIGHT: 63 IN | TEMPERATURE: 97 F | SYSTOLIC BLOOD PRESSURE: 130 MMHG | OXYGEN SATURATION: 98 % | BODY MASS INDEX: 34.34 KG/M2

## 2021-06-11 DIAGNOSIS — R10.30 LOWER ABDOMINAL PAIN: ICD-10-CM

## 2021-06-11 DIAGNOSIS — M75.42 IMPINGEMENT SYNDROME OF LEFT SHOULDER: ICD-10-CM

## 2021-06-11 DIAGNOSIS — M75.42 IMPINGEMENT SYNDROME OF LEFT SHOULDER: Primary | ICD-10-CM

## 2021-06-11 DIAGNOSIS — R10.2 PELVIC PAIN IN PREGNANCY: Primary | ICD-10-CM

## 2021-06-11 DIAGNOSIS — O26.899 PELVIC PAIN IN PREGNANCY: Primary | ICD-10-CM

## 2021-06-11 DIAGNOSIS — Z01.818 PREOP EXAMINATION: Primary | ICD-10-CM

## 2021-06-11 DIAGNOSIS — B37.9 YEAST INFECTION: ICD-10-CM

## 2021-06-11 PROBLEM — G43.009 MIGRAINE WITHOUT AURA AND WITHOUT STATUS MIGRAINOSUS, NOT INTRACTABLE: Status: ACTIVE | Noted: 2017-10-10

## 2021-06-11 PROBLEM — J45.40 MODERATE PERSISTENT ASTHMA WITHOUT COMPLICATION: Status: ACTIVE | Noted: 2017-10-10

## 2021-06-11 PROBLEM — D64.9 ANEMIA: Status: ACTIVE | Noted: 2019-01-18

## 2021-06-11 LAB
ANION GAP SERPL CALC-SCNC: 11 MEQ/L (ref 3–15)
BILIRUB UR QL STRIP.AUTO: NEGATIVE MG/DL
BUN SERPL-MCNC: 13 MG/DL (ref 8–20)
CALCIUM SERPL-MCNC: 9.2 MG/DL (ref 8.9–10.3)
CHLORIDE SERPL-SCNC: 101 MEQ/L (ref 98–109)
CLARITY UR REFRACT.AUTO: CLEAR
CO2 SERPL-SCNC: 23 MEQ/L (ref 22–32)
COLOR UR AUTO: YELLOW
CREAT SERPL-MCNC: 0.7 MG/DL (ref 0.6–1.1)
ERYTHROCYTE [DISTWIDTH] IN BLOOD BY AUTOMATED COUNT: 16.2 % (ref 11.7–14.4)
GFR SERPL CREATININE-BSD FRML MDRD: >60 ML/MIN/1.73M*2
GLUCOSE SERPL-MCNC: 83 MG/DL (ref 70–99)
GLUCOSE UR STRIP.AUTO-MCNC: NEGATIVE MG/DL
HCG UR QL: NEGATIVE
HCT VFR BLDCO AUTO: 35.4 % (ref 35–45)
HGB BLD-MCNC: 11.1 G/DL (ref 11.8–15.7)
HGB UR QL STRIP.AUTO: NEGATIVE
KETONES UR STRIP.AUTO-MCNC: NEGATIVE MG/DL
LEUKOCYTE ESTERASE UR QL STRIP.AUTO: NEGATIVE
MCH RBC QN AUTO: 24.2 PG (ref 28–33.2)
MCHC RBC AUTO-ENTMCNC: 31.4 G/DL (ref 32.2–35.5)
MCV RBC AUTO: 77.1 FL (ref 83–98)
NITRITE UR QL STRIP.AUTO: NEGATIVE
PDW BLD AUTO: 9.8 FL (ref 9.4–12.3)
PH UR STRIP.AUTO: 5.5 [PH]
PLATELET # BLD AUTO: 387 K/UL (ref 150–369)
POCT TEST (UMAC): ABNORMAL
POTASSIUM SERPL-SCNC: 4.2 MEQ/L (ref 3.6–5.1)
PROT UR QL STRIP.AUTO: NEGATIVE
RBC # BLD AUTO: 4.59 M/UL (ref 3.93–5.22)
SARS-COV-2 RNA RESP QL NAA+PROBE: NEGATIVE
SODIUM SERPL-SCNC: 135 MEQ/L (ref 136–144)
SP GR UR REFRACT.AUTO: >=1.03
UROBILINOGEN UR STRIP-ACNC: 0.2 EU/DL
WBC # BLD AUTO: 9.51 K/UL (ref 3.8–10.5)

## 2021-06-11 PROCEDURE — U0003 INFECTIOUS AGENT DETECTION BY NUCLEIC ACID (DNA OR RNA); SEVERE ACUTE RESPIRATORY SYNDROME CORONAVIRUS 2 (SARS-COV-2) (CORONAVIRUS DISEASE [COVID-19]), AMPLIFIED PROBE TECHNIQUE, MAKING USE OF HIGH THROUGHPUT TECHNOLOGIES AS DESCRIBED BY CMS-2020-01-R: HCPCS

## 2021-06-11 PROCEDURE — 99999 PR OFFICE/OUTPT VISIT,PROCEDURE ONLY: CPT | Mod: GC | Performed by: STUDENT IN AN ORGANIZED HEALTH CARE EDUCATION/TRAINING PROGRAM

## 2021-06-11 PROCEDURE — 36415 COLL VENOUS BLD VENIPUNCTURE: CPT

## 2021-06-11 PROCEDURE — 85027 COMPLETE CBC AUTOMATED: CPT

## 2021-06-11 PROCEDURE — 80048 BASIC METABOLIC PNL TOTAL CA: CPT

## 2021-06-11 PROCEDURE — 84703 CHORIONIC GONADOTROPIN ASSAY: CPT

## 2021-06-11 RX ORDER — FLUCONAZOLE 150 MG/1
150 TABLET ORAL ONCE
Qty: 1 TABLET | Refills: 0 | Status: SHIPPED | OUTPATIENT
Start: 2021-06-11 | End: 2021-06-11

## 2021-06-11 ASSESSMENT — ENCOUNTER SYMPTOMS
ALLERGIC/IMMUNOLOGIC COMMENTS: LATEX ALLERGY
ABDOMINAL PAIN: 1
ARTHRALGIAS: 1
VOMITING: 0
FEVER: 0
CHILLS: 0
NAUSEA: 0

## 2021-06-11 ASSESSMENT — PAIN SCALES - GENERAL
PAINLEVEL: 6
PAINLEVEL: 8

## 2021-06-11 NOTE — PROGRESS NOTES
"2021  Chrissy Tomlinson is a 36 y.o. female who presents for evaluation of abdominal pain.     Patient reports she is \"not doing good\". She reports lower abdominal/pelvic pain which is a 6/10. Pain is intermittent. She hasn't tried anything to make pain better. Patient is sleeping normally. She reports her symptoms started 3 days ago. Symptoms began after she used the wrong soap to wash her body when she was on vacation in [facility]. She reports her vagina feels irritated and believes her symptoms are similar to a UTI. She has had one UTI previously.    Patient denies increased vaginal discharge or odor. Patient does have a history of recurrent BV although does not believe her current symptoms are related to BV since she doesn't have any vaginal odor. She reports urinary frequency and incontinence. She denies dysuria or hesitancy. Patient reports last sexually active in February. LMP 21. Patient reports 2 cycles/month for last 3 months. Not currently on contraception.     PMH: Asthma and Anemia     Visit Vitals  /79 (BP Location: Left upper arm, Patient Position: Sitting)   Temp 36.1 °C (97 °F) (Temporal)   Ht 1.6 m (5' 3\")   Wt 87.9 kg (193 lb 12.8 oz)   LMP 2021   SpO2 98%   BMI 34.33 kg/m²     Menstrual History:  OB History        9    Para   5    Term                AB   4    Living   5       SAB   2    TAB   2    Ectopic        Multiple        Live Births                    Patient's last menstrual period was 2021.       The following portions of the patient's history were reviewed and updated as appropriate: allergies, current medications, past medical history and problem list.    General:   alert, appears stated age, cooperative and moderately obese       Lungs:  respirations unlabored   Abdomen: soft, non-tender, without masses or organomegaly   Vulva: normal   Vagina: curdlike white discharge - ph 4.5   Cervix: no lesions and multiparous    Uterus: normal size, mobile, " tender   Adnexa: normal adnexa and no mass, fullness, tenderness   The following have been marked reviewed and updated as appropriate in this visit:  Allergies  Meds  Problems       Problem List Items Addressed This Visit        Nervous    Lower abdominal pain     Likely due to yeast infection - fluconazole sent          Relevant Orders    Urinalysis hold for Urine Culture       Infectious/Inflammatory    Yeast infection     Wet mount c/w yeast - UA neg for signs of UTI. Ucx also sent. Fluconazole sent for yeast         Relevant Medications    fluconazole (DIFLUCAN) 150 mg tablet          Return if symptoms worsen or fail to improve.  April Crawford MD

## 2021-06-11 NOTE — PRE-PROCEDURE INSTRUCTIONS
1. We will call you between 3 pm and 7 pm on Rayna 15, 2021 to determine that arrival time for your procedure. If you do not hear by 6PM. Please call [phone] for arrival time.    2. Please report to Main Entrance near Parking lot A, walk into main lobby and report to the admission desk on the first floor on the day of your procedure.       3. Please follow the following fasting guidelines:         Nothing to eat (no solid food) after midnight.    Unlimited clear liquids, meaning water or PLAIN black coffee WITHOUT any milk or cream, are permitted up to TWO HOURS prior to arrival at the hospital.     4. Early on the morning of the procedure please take your usual dose of the listed medications cetirizine (ZyrTEC) with a sip of water:    AVOID ASPIRIN, MOTRIN, ADVIL, ALEVE, VITAMIN E, HERBAL SUPPLEMENTS 1 WEEK PRIOR TO SURGERY    YOU MAY TAKE TYLENOL FOR PAIN     5. Other Instructions: You may brush your teeth the morning of the procedure. Rinse and spit, do not swallow.  Bring a list of your medications with dosages with you.  Use surgical wash as directed.    6. If you develop a cold, cough, fever, rash, or other symptom prior to the data of the procedure, please report it to your physician immediately.   7. If you need to cancel the procedure for any reason, please contact your physician or call the unit listed above.   8. Make arrangements to have someone drive you home from the procedure. If you have not arranged for transportation home, your surgery may be cancelled.    9. You may not take public transportation unless accompanied by a responsible person.   10. You may not drive a car or operate complex or potentially dangerous machinery for 24 hours following anesthesia and/or sedation.   11. If it is medically necessary for you to have a longer stay, you will be informed as soon as the decision is made.   12. Do not wear or bring anything of value to the hospital including jewelry of any kind, money, or  wallet. Do not wear make-up or contact lenses. DO bring your glasses and a case. DO NOT BRING MEDICATIONS FROM HOME.   13. No lotion, creams, powders, or oils on skin the morning of procedure    14. Dress in comfortable clothes.   15.  If instructed, please bring a copy of your Advanced Directive (Living Will/Durable Power of ) on the day of your procedure.      Pre operative instructions given as per protocol.  Form explained by: CRYSTAL Iyer     I have read and understand the above information. I have had sufficient opportunity to ask questions I might have and they have been answered to my satisfaction. I agree to comply with the Patient Responsibilities listed above and have received a copy of this form.

## 2021-06-11 NOTE — H&P (VIEW-ONLY)
History and Physical  Pre-admission testing         HISTORY OF PRESENT ILLNESS      Chrissy Tomlinson is an 36 y.o. female with a past medical history of asthma, migraines. She presents to [facility] with diagnosis of left shoulder impingement syndrome for preoperative evaluation prior to planned surgery. She reports that she injured her left shoulder as a result of falling while at work 2 years ago. She states the she has had conservative treatment with physical therapy and steroid injections. She continues to have left shoulder and neck pain. She is scheduled for Surgical Arthroscopy Left Shoulder on 6/16/2021 with Dr Urbina.    PAST MEDICAL AND SURGICAL HISTORY      Past Medical History:   Diagnosis Date   • Asthma    • Migraines    • Seasonal allergies        Past Surgical History:   Procedure Laterality Date   • LAPAROTOMY EXPLORATORY  2005       PROBLEM LIST     Patient Active Problem List   Diagnosis   • Possible exposure to STD   • Anemia   • Migraine without aura and without status migrainosus, not intractable   • Moderate persistent asthma without complication       MEDICATIONS        Current Outpatient Medications:   •  albuterol HFA (PROVENTIL HFA) 90 mcg/actuation inhaler, Inhale 2 puffs as needed.  , Disp: , Rfl:   •  cetirizine (ZyrTEC) 10 mg capsule, Take 10 mg by mouth daily., Disp: , Rfl:   •  fluticasone propionate (FLONASE) 50 mcg/actuation nasal spray, Administer 1 spray into each nostril daily as needed.  , Disp: , Rfl:   •  ondansetron ODT (ZOFRAN ODT) 4 mg disintegrating tablet, Take 4 mg by mouth as needed.  , Disp: , Rfl:   •  SUMAtriptan (IMITREX) 50 mg tablet, Take 50 mg by mouth once as needed.  , Disp: , Rfl:   No current facility-administered medications for this visit.    ALLERGIES      Allergies   Allergen Reactions   • Latex Hives       FAMILY HISTORY      Family History   Problem Relation Age of Onset   • Diabetes Biological Mother    • COPD Biological Father    • Heart disease Biological  Father        SOCIAL HISTORY      Social History     Socioeconomic History   • Marital status: Single     Spouse name: Not on file   • Number of children: 5   • Years of education: Not on file   • Highest education level: Not on file   Occupational History   • Not on file   Tobacco Use   • Smoking status: Former Smoker     Types: Cigarettes   • Smokeless tobacco: Never Used   • Tobacco comment: Quit 5.5 years ago   Substance and Sexual Activity   • Alcohol use: Yes     Comment: Occasionally   • Drug use: Not Currently   • Sexual activity: Not Currently     Comment: Depo   Other Topics Concern   • Not on file   Social History Narrative    Lives with family in a 2 story home     Social Determinants of Health     Financial Resource Strain:    • Difficulty of Paying Living Expenses:    Food Insecurity:    • Worried About Running Out of Food in the Last Year:    • Ran Out of Food in the Last Year:    Transportation Needs:    • Lack of Transportation (Medical):    • Lack of Transportation (Non-Medical):    Physical Activity:    • Days of Exercise per Week:    • Minutes of Exercise per Session:    Stress:    • Feeling of Stress :    Social Connections:    • Frequency of Communication with Friends and Family:    • Frequency of Social Gatherings with Friends and Family:    • Attends Jain Services:    • Active Member of Clubs or Organizations:    • Attends Club or Organization Meetings:    • Marital Status:    Intimate Partner Violence:    • Fear of Current or Ex-Partner:    • Emotionally Abused:    • Physically Abused:    • Sexually Abused:        REVIEW OF SYSTEMS      Review of Systems   Constitutional: Negative for chills and fever.   Gastrointestinal: Positive for abdominal pain. Negative for nausea and vomiting.        Lower abdominal pain x 3 days   Musculoskeletal: Positive for arthralgias.        Left shoulder/ neck pain   Allergic/Immunologic:        Latex allergy   All other systems reviewed and are  "negative.      PHYSICAL EXAMINATION      Visit Vitals  /83 (BP Location: Right upper arm, Patient Position: Sitting)   Pulse 85   Temp 36.4 °C (97.5 °F) (Temporal)   Resp 18   Ht 1.6 m (5' 3\")   Wt 87.5 kg (193 lb)   LMP 05/30/2021   SpO2 96% Comment: Rm Air   BMI 34.19 kg/m²     Body mass index is 34.19 kg/m².    Physical Exam  Vitals reviewed.   Constitutional:       Appearance: Normal appearance.   HENT:      Head: Normocephalic and atraumatic.      Nose: Nose normal. No congestion or rhinorrhea.      Mouth/Throat:      Comments: deferred  Eyes:      Conjunctiva/sclera: Conjunctivae normal.      Pupils: Pupils are equal, round, and reactive to light.   Cardiovascular:      Rate and Rhythm: Normal rate and regular rhythm.      Pulses: Normal pulses.           Dorsalis pedis pulses are 2+ on the right side and 2+ on the left side.        Posterior tibial pulses are 2+ on the right side and 2+ on the left side.      Heart sounds: Normal heart sounds and S1 normal. No murmur heard.     Pulmonary:      Effort: Pulmonary effort is normal.      Breath sounds: Normal breath sounds.   Abdominal:      General: Bowel sounds are normal. There is no distension.      Palpations: Abdomen is soft.      Tenderness: There is abdominal tenderness. There is no guarding or rebound.      Comments: + tenderness with deep palpation at the mid-lower abdomen   Genitourinary:     Comments: Deferred  Musculoskeletal:         General: Normal range of motion.      Cervical back: Normal range of motion and neck supple.      Right lower leg: No edema.      Left lower leg: No edema.      Comments: Limited ROM at the left shoulder   Skin:     General: Skin is warm and dry.   Neurological:      General: No focal deficit present.      Mental Status: She is alert and oriented to person, place, and time.   Psychiatric:         Mood and Affect: Mood normal.         Behavior: Behavior normal.            CRYSTAL Iyer  6/11/2021     "

## 2021-06-11 NOTE — H&P
History and Physical  Pre-admission testing         HISTORY OF PRESENT ILLNESS      Chrissy Tomlinson is an 36 y.o. female with a past medical history of asthma, migraines. She presents to [facility] with diagnosis of left shoulder impingement syndrome for preoperative evaluation prior to planned surgery. She reports that she injured her left shoulder as a result of falling while at work 2 years ago. She states the she has had conservative treatment with physical therapy and steroid injections. She continues to have left shoulder and neck pain. She is scheduled for Surgical Arthroscopy Left Shoulder on 6/16/2021 with Dr Urbina.    PAST MEDICAL AND SURGICAL HISTORY      Past Medical History:   Diagnosis Date   • Asthma    • Migraines    • Seasonal allergies        Past Surgical History:   Procedure Laterality Date   • LAPAROTOMY EXPLORATORY  2005       PROBLEM LIST     Patient Active Problem List   Diagnosis   • Possible exposure to STD   • Anemia   • Migraine without aura and without status migrainosus, not intractable   • Moderate persistent asthma without complication       MEDICATIONS        Current Outpatient Medications:   •  albuterol HFA (PROVENTIL HFA) 90 mcg/actuation inhaler, Inhale 2 puffs as needed.  , Disp: , Rfl:   •  cetirizine (ZyrTEC) 10 mg capsule, Take 10 mg by mouth daily., Disp: , Rfl:   •  fluticasone propionate (FLONASE) 50 mcg/actuation nasal spray, Administer 1 spray into each nostril daily as needed.  , Disp: , Rfl:   •  ondansetron ODT (ZOFRAN ODT) 4 mg disintegrating tablet, Take 4 mg by mouth as needed.  , Disp: , Rfl:   •  SUMAtriptan (IMITREX) 50 mg tablet, Take 50 mg by mouth once as needed.  , Disp: , Rfl:   No current facility-administered medications for this visit.    ALLERGIES      Allergies   Allergen Reactions   • Latex Hives       FAMILY HISTORY      Family History   Problem Relation Age of Onset   • Diabetes Biological Mother    • COPD Biological Father    • Heart disease Biological  Father        SOCIAL HISTORY      Social History     Socioeconomic History   • Marital status: Single     Spouse name: Not on file   • Number of children: 5   • Years of education: Not on file   • Highest education level: Not on file   Occupational History   • Not on file   Tobacco Use   • Smoking status: Former Smoker     Types: Cigarettes   • Smokeless tobacco: Never Used   • Tobacco comment: Quit 5.5 years ago   Substance and Sexual Activity   • Alcohol use: Yes     Comment: Occasionally   • Drug use: Not Currently   • Sexual activity: Not Currently     Comment: Depo   Other Topics Concern   • Not on file   Social History Narrative    Lives with family in a 2 story home     Social Determinants of Health     Financial Resource Strain:    • Difficulty of Paying Living Expenses:    Food Insecurity:    • Worried About Running Out of Food in the Last Year:    • Ran Out of Food in the Last Year:    Transportation Needs:    • Lack of Transportation (Medical):    • Lack of Transportation (Non-Medical):    Physical Activity:    • Days of Exercise per Week:    • Minutes of Exercise per Session:    Stress:    • Feeling of Stress :    Social Connections:    • Frequency of Communication with Friends and Family:    • Frequency of Social Gatherings with Friends and Family:    • Attends Alevism Services:    • Active Member of Clubs or Organizations:    • Attends Club or Organization Meetings:    • Marital Status:    Intimate Partner Violence:    • Fear of Current or Ex-Partner:    • Emotionally Abused:    • Physically Abused:    • Sexually Abused:        REVIEW OF SYSTEMS      Review of Systems   Constitutional: Negative for chills and fever.   Gastrointestinal: Positive for abdominal pain. Negative for nausea and vomiting.        Lower abdominal pain x 3 days   Musculoskeletal: Positive for arthralgias.        Left shoulder/ neck pain   Allergic/Immunologic:        Latex allergy   All other systems reviewed and are  "negative.      PHYSICAL EXAMINATION      Visit Vitals  /83 (BP Location: Right upper arm, Patient Position: Sitting)   Pulse 85   Temp 36.4 °C (97.5 °F) (Temporal)   Resp 18   Ht 1.6 m (5' 3\")   Wt 87.5 kg (193 lb)   LMP 05/30/2021   SpO2 96% Comment: Rm Air   BMI 34.19 kg/m²     Body mass index is 34.19 kg/m².    Physical Exam  Vitals reviewed.   Constitutional:       Appearance: Normal appearance.   HENT:      Head: Normocephalic and atraumatic.      Nose: Nose normal. No congestion or rhinorrhea.      Mouth/Throat:      Comments: deferred  Eyes:      Conjunctiva/sclera: Conjunctivae normal.      Pupils: Pupils are equal, round, and reactive to light.   Cardiovascular:      Rate and Rhythm: Normal rate and regular rhythm.      Pulses: Normal pulses.           Dorsalis pedis pulses are 2+ on the right side and 2+ on the left side.        Posterior tibial pulses are 2+ on the right side and 2+ on the left side.      Heart sounds: Normal heart sounds and S1 normal. No murmur heard.     Pulmonary:      Effort: Pulmonary effort is normal.      Breath sounds: Normal breath sounds.   Abdominal:      General: Bowel sounds are normal. There is no distension.      Palpations: Abdomen is soft.      Tenderness: There is abdominal tenderness. There is no guarding or rebound.      Comments: + tenderness with deep palpation at the mid-lower abdomen   Genitourinary:     Comments: Deferred  Musculoskeletal:         General: Normal range of motion.      Cervical back: Normal range of motion and neck supple.      Right lower leg: No edema.      Left lower leg: No edema.      Comments: Limited ROM at the left shoulder   Skin:     General: Skin is warm and dry.   Neurological:      General: No focal deficit present.      Mental Status: She is alert and oriented to person, place, and time.   Psychiatric:         Mood and Affect: Mood normal.         Behavior: Behavior normal.            CRYSTAL Iyer  6/11/2021     "

## 2021-06-15 ENCOUNTER — ANESTHESIA EVENT (OUTPATIENT)
Dept: SURGERY | Facility: HOSPITAL | Age: 36
Setting detail: HOSPITAL OUTPATIENT SURGERY
End: 2021-06-15
Payer: OTHER MISCELLANEOUS

## 2021-06-15 ASSESSMENT — ENCOUNTER SYMPTOMS: HEADACHES: 1

## 2021-06-15 NOTE — ANESTHESIA PREPROCEDURE EVALUATION
Relevant Problems   HEMATOLOGY   (+) Anemia      RESPIRATORY SYSTEM   (+) Moderate persistent asthma without complication      Other   (+) Yeast infection       Anesthesia ROS/MED HX    Anesthesia History - neg  Pulmonary    asthma  Neuro/Psych    Headaches  Cardiovascular- neg   Covid19 Test Reviewed  Hematological    anemia  GI/Hepatic- neg  Musculoskeletal- neg  Renal Disease- neg  Endo/Other- neg  Body Habitus: Obese       Past Surgical History:   Procedure Laterality Date   • LAPAROTOMY EXPLORATORY  2005       Physical Exam    Airway   Mallampati: II   TM distance: >3 FB   Neck ROM: full  Cardiovascular - normal   Rhythm: regular   Rate: normalPulmonary - normal   clear to auscultation  Dental           Anesthesia Plan    Plan: general    Technique: general endotracheal and nerve block     Lines and Monitors: PIV     Airway: video laryngoscope   ASA 2  Induction:    intravenous   Postop Plan:   Patient Disposition: phase II then home   Pain Management: IV analgesics and interscalene block

## 2021-06-16 ENCOUNTER — ANESTHESIA (OUTPATIENT)
Dept: SURGERY | Facility: HOSPITAL | Age: 36
Setting detail: HOSPITAL OUTPATIENT SURGERY
End: 2021-06-16
Payer: OTHER MISCELLANEOUS

## 2021-06-16 ENCOUNTER — HOSPITAL ENCOUNTER (OUTPATIENT)
Facility: HOSPITAL | Age: 36
Setting detail: HOSPITAL OUTPATIENT SURGERY
Discharge: HOME | End: 2021-06-16
Attending: ORTHOPAEDIC SURGERY | Admitting: ORTHOPAEDIC SURGERY
Payer: OTHER MISCELLANEOUS

## 2021-06-16 VITALS
HEIGHT: 63 IN | SYSTOLIC BLOOD PRESSURE: 116 MMHG | HEART RATE: 84 BPM | DIASTOLIC BLOOD PRESSURE: 82 MMHG | WEIGHT: 195 LBS | OXYGEN SATURATION: 100 % | BODY MASS INDEX: 34.55 KG/M2 | RESPIRATION RATE: 20 BRPM | TEMPERATURE: 97.1 F

## 2021-06-16 LAB
B-HCG UR QL: NEGATIVE
POCT TEST: NORMAL

## 2021-06-16 PROCEDURE — 0RNK4ZZ RELEASE LEFT SHOULDER JOINT, PERCUTANEOUS ENDOSCOPIC APPROACH: ICD-10-PCS | Performed by: ORTHOPAEDIC SURGERY

## 2021-06-16 PROCEDURE — 71000002 HC PACU PHASE 2 INITIAL 30MIN: Performed by: ORTHOPAEDIC SURGERY

## 2021-06-16 PROCEDURE — 36000004 HC OR LEVEL 4 INITIAL 30MIN: Performed by: ORTHOPAEDIC SURGERY

## 2021-06-16 PROCEDURE — 25800000 HC PHARMACY IV SOLUTIONS: Performed by: STUDENT IN AN ORGANIZED HEALTH CARE EDUCATION/TRAINING PROGRAM

## 2021-06-16 PROCEDURE — 37000001 HC ANESTHESIA GENERAL: Performed by: ORTHOPAEDIC SURGERY

## 2021-06-16 PROCEDURE — 71000012 HC PACU PHASE 2 EA ADDL MIN: Performed by: ORTHOPAEDIC SURGERY

## 2021-06-16 PROCEDURE — 71000001 HC PACU PHASE 1 INITIAL 30MIN: Performed by: ORTHOPAEDIC SURGERY

## 2021-06-16 PROCEDURE — 25000000 HC PHARMACY GENERAL: Performed by: NURSE ANESTHETIST, CERTIFIED REGISTERED

## 2021-06-16 PROCEDURE — 71000011 HC PACU PHASE 1 EA ADDL MIN: Performed by: ORTHOPAEDIC SURGERY

## 2021-06-16 PROCEDURE — 63600000 HC DRUGS/DETAIL CODE: Performed by: NURSE ANESTHETIST, CERTIFIED REGISTERED

## 2021-06-16 PROCEDURE — 27200000 HC STERILE SUPPLY: Performed by: ORTHOPAEDIC SURGERY

## 2021-06-16 PROCEDURE — 36000014 HC OR LEVEL 4 EA ADDL MIN: Performed by: ORTHOPAEDIC SURGERY

## 2021-06-16 PROCEDURE — 63600000 HC DRUGS/DETAIL CODE: Performed by: ANESTHESIOLOGY

## 2021-06-16 PROCEDURE — 0RBK4ZZ EXCISION OF LEFT SHOULDER JOINT, PERCUTANEOUS ENDOSCOPIC APPROACH: ICD-10-PCS | Performed by: ORTHOPAEDIC SURGERY

## 2021-06-16 RX ORDER — DEXTROSE 50 % IN WATER (D50W) INTRAVENOUS SYRINGE
25 AS NEEDED
Status: DISCONTINUED | OUTPATIENT
Start: 2021-06-16 | End: 2021-06-16 | Stop reason: HOSPADM

## 2021-06-16 RX ORDER — FENTANYL CITRATE 50 UG/ML
50 INJECTION, SOLUTION INTRAMUSCULAR; INTRAVENOUS
Status: DISCONTINUED | OUTPATIENT
Start: 2021-06-16 | End: 2021-06-16 | Stop reason: HOSPADM

## 2021-06-16 RX ORDER — ONDANSETRON 4 MG/1
4 TABLET, ORALLY DISINTEGRATING ORAL EVERY 8 HOURS PRN
Status: DISCONTINUED | OUTPATIENT
Start: 2021-06-16 | End: 2021-06-16 | Stop reason: HOSPADM

## 2021-06-16 RX ORDER — ONDANSETRON HYDROCHLORIDE 2 MG/ML
INJECTION, SOLUTION INTRAVENOUS AS NEEDED
Status: DISCONTINUED | OUTPATIENT
Start: 2021-06-16 | End: 2021-06-16 | Stop reason: SURG

## 2021-06-16 RX ORDER — CEFAZOLIN SODIUM 2 G/50ML
2 SOLUTION INTRAVENOUS
Status: DISCONTINUED | OUTPATIENT
Start: 2021-06-16 | End: 2021-06-16 | Stop reason: HOSPADM

## 2021-06-16 RX ORDER — OXYCODONE HYDROCHLORIDE 5 MG/1
5-10 TABLET ORAL
Status: DISCONTINUED | OUTPATIENT
Start: 2021-06-16 | End: 2021-06-16 | Stop reason: HOSPADM

## 2021-06-16 RX ORDER — FENTANYL CITRATE 50 UG/ML
INJECTION, SOLUTION INTRAMUSCULAR; INTRAVENOUS AS NEEDED
Status: DISCONTINUED | OUTPATIENT
Start: 2021-06-16 | End: 2021-06-16 | Stop reason: SURG

## 2021-06-16 RX ORDER — KETOROLAC TROMETHAMINE 30 MG/ML
INJECTION, SOLUTION INTRAMUSCULAR; INTRAVENOUS AS NEEDED
Status: DISCONTINUED | OUTPATIENT
Start: 2021-06-16 | End: 2021-06-16 | Stop reason: SURG

## 2021-06-16 RX ORDER — MORPHINE SULFATE 2 MG/ML
1 INJECTION, SOLUTION INTRAMUSCULAR; INTRAVENOUS
Status: DISCONTINUED | OUTPATIENT
Start: 2021-06-16 | End: 2021-06-16 | Stop reason: HOSPADM

## 2021-06-16 RX ORDER — HYDROMORPHONE HYDROCHLORIDE 1 MG/ML
0.5 INJECTION, SOLUTION INTRAMUSCULAR; INTRAVENOUS; SUBCUTANEOUS
Status: DISCONTINUED | OUTPATIENT
Start: 2021-06-16 | End: 2021-06-16 | Stop reason: HOSPADM

## 2021-06-16 RX ORDER — PROPOFOL 10 MG/ML
INJECTION, EMULSION INTRAVENOUS AS NEEDED
Status: DISCONTINUED | OUTPATIENT
Start: 2021-06-16 | End: 2021-06-16 | Stop reason: SURG

## 2021-06-16 RX ORDER — IBUPROFEN 200 MG
16-32 TABLET ORAL AS NEEDED
Status: DISCONTINUED | OUTPATIENT
Start: 2021-06-16 | End: 2021-06-16 | Stop reason: HOSPADM

## 2021-06-16 RX ORDER — ONDANSETRON HYDROCHLORIDE 2 MG/ML
4 INJECTION, SOLUTION INTRAVENOUS
Status: DISCONTINUED | OUTPATIENT
Start: 2021-06-16 | End: 2021-06-16 | Stop reason: HOSPADM

## 2021-06-16 RX ORDER — ROPIVACAINE HYDROCHLORIDE 5 MG/ML
INJECTION, SOLUTION EPIDURAL; INFILTRATION; PERINEURAL
Status: COMPLETED | OUTPATIENT
Start: 2021-06-16 | End: 2021-06-16

## 2021-06-16 RX ORDER — SODIUM CHLORIDE 9 MG/ML
INJECTION, SOLUTION INTRAVENOUS CONTINUOUS
Status: DISCONTINUED | OUTPATIENT
Start: 2021-06-16 | End: 2021-06-16 | Stop reason: HOSPADM

## 2021-06-16 RX ORDER — LIDOCAINE HYDROCHLORIDE 10 MG/ML
INJECTION, SOLUTION INFILTRATION; PERINEURAL AS NEEDED
Status: DISCONTINUED | OUTPATIENT
Start: 2021-06-16 | End: 2021-06-16 | Stop reason: SURG

## 2021-06-16 RX ORDER — DEXAMETHASONE SODIUM PHOSPHATE 4 MG/ML
INJECTION, SOLUTION INTRA-ARTICULAR; INTRALESIONAL; INTRAMUSCULAR; INTRAVENOUS; SOFT TISSUE
Status: COMPLETED | OUTPATIENT
Start: 2021-06-16 | End: 2021-06-16

## 2021-06-16 RX ORDER — MIDAZOLAM HYDROCHLORIDE 2 MG/2ML
INJECTION, SOLUTION INTRAMUSCULAR; INTRAVENOUS AS NEEDED
Status: DISCONTINUED | OUTPATIENT
Start: 2021-06-16 | End: 2021-06-16 | Stop reason: SURG

## 2021-06-16 RX ORDER — ROCURONIUM BROMIDE 10 MG/ML
INJECTION, SOLUTION INTRAVENOUS AS NEEDED
Status: DISCONTINUED | OUTPATIENT
Start: 2021-06-16 | End: 2021-06-16 | Stop reason: SURG

## 2021-06-16 RX ORDER — ACETAMINOPHEN 325 MG/1
650 TABLET ORAL EVERY 4 HOURS PRN
Status: DISCONTINUED | OUTPATIENT
Start: 2021-06-16 | End: 2021-06-16 | Stop reason: HOSPADM

## 2021-06-16 RX ORDER — DEXTROSE 40 %
15-30 GEL (GRAM) ORAL AS NEEDED
Status: DISCONTINUED | OUTPATIENT
Start: 2021-06-16 | End: 2021-06-16 | Stop reason: HOSPADM

## 2021-06-16 RX ADMIN — MIDAZOLAM HYDROCHLORIDE 2 MG: 1 INJECTION, SOLUTION INTRAMUSCULAR; INTRAVENOUS at 13:22

## 2021-06-16 RX ADMIN — FENTANYL CITRATE 50 MCG: 50 INJECTION, SOLUTION INTRAMUSCULAR; INTRAVENOUS at 13:31

## 2021-06-16 RX ADMIN — KETOROLAC TROMETHAMINE 30 MG: 30 INJECTION, SOLUTION INTRAMUSCULAR; INTRAVENOUS at 14:12

## 2021-06-16 RX ADMIN — ROPIVACAINE HYDROCHLORIDE 30 ML: 5 INJECTION, SOLUTION EPIDURAL; INFILTRATION; PERINEURAL at 13:35

## 2021-06-16 RX ADMIN — CEFAZOLIN 2 G: 330 INJECTION, POWDER, FOR SOLUTION INTRAMUSCULAR; INTRAVENOUS at 13:26

## 2021-06-16 RX ADMIN — PROPOFOL INJECTABLE EMULSION 200 MG: 10 INJECTION, EMULSION INTRAVENOUS at 13:36

## 2021-06-16 RX ADMIN — FENTANYL CITRATE 50 MCG: 50 INJECTION, SOLUTION INTRAMUSCULAR; INTRAVENOUS at 13:22

## 2021-06-16 RX ADMIN — SUGAMMADEX 200 MG: 100 INJECTION, SOLUTION INTRAVENOUS at 14:16

## 2021-06-16 RX ADMIN — LIDOCAINE HYDROCHLORIDE 5 ML: 10 INJECTION, SOLUTION INFILTRATION; PERINEURAL at 13:36

## 2021-06-16 RX ADMIN — ROCURONIUM BROMIDE 50 MG: 10 INJECTION, SOLUTION INTRAVENOUS at 13:36

## 2021-06-16 RX ADMIN — DEXAMETHASONE SODIUM PHOSPHATE 4 MG: 4 INJECTION, SOLUTION INTRAMUSCULAR; INTRAVENOUS at 13:35

## 2021-06-16 RX ADMIN — ONDANSETRON HYDROCHLORIDE 4 MG: 2 SOLUTION INTRAMUSCULAR; INTRAVENOUS at 14:08

## 2021-06-16 RX ADMIN — MIDAZOLAM HYDROCHLORIDE 1 MG: 1 INJECTION, SOLUTION INTRAMUSCULAR; INTRAVENOUS at 13:31

## 2021-06-16 RX ADMIN — SODIUM CHLORIDE: 9 INJECTION, SOLUTION INTRAVENOUS at 13:21

## 2021-06-16 NOTE — ANESTHESIA PROCEDURE NOTES
Peripheral Block    Patient location during procedure: OR  Start time: 6/16/2021 1:30 PM  End time: 6/16/2021 1:35 PM  Reason for block: at surgeon's request and post-op pain management  Staffing  Performed: anesthesiologist   Anesthesiologist: Elgin King MD  Preanesthetic Checklist  Completed: patient identified, site marked, surgical consent, pre-op evaluation, timeout performed, IV checked, risks and benefits discussed, monitors and equipment checked and sterile field maintained during procedure  Peripheral Block  Patient position: supine  Prep: ChloraPrep and site prepped and draped  Patient monitoring: heart rate, cardiac monitor, continuous pulse ox and blood pressure  Block type: interscalene  Laterality: left  Injection technique: single-shot      Guidance: nerve stimulator and ultrasound guided  Image captured and stored.        Needle  Test dose: negative  Assessment  Injection assessment: negative aspiration for heme, incremental injection, no paresthesia on injection, local visualized surrounding nerve on ultrasound and transient paresthesias  Paresthesia pain: immediately resolved  Heart rate change: no  Slow fractionated injection: yes  Medications Administered - 6/16/2021 1:35 PM   Ropivacaine PF (NAROPIN) injection 0.5 %, 30 mL  dexamethasone (DECADRON) injection 4 mg/mL, 4 mg

## 2021-06-16 NOTE — DISCHARGE INSTRUCTIONS
[facility] Post Operative Surgical Instructions    Surgeon: Yao Urbina, DO          You had the following procedure performed at [facility] on 6/16/2021    Procedure: Left shoulder arthroscopy    Weight Bearing:  -Weight bearing as tolerated to the left upper extremity. Use sling for comfort    Dressing/Wound Care:  -You may remove the dressing on post operative day 3 and place a bandaid over the incisions.  -Keep dressings clean and dry.  -Once dressing is removed, DO NOT soak incision, NO baths. After you shower, pat incision dry lightly.  -Some drainage from incisions is to be expected.  However, if significant drainage occurs, call the office or go to emergency room immediately.  -If you experience fevers and chills with increase in pain, redness and/or drainage from incisions, call the office or go to emergency room immediately.    Office Follow Up:  -If you do not already have a follow up appointment, please call the following office as soon as you get home to schedule an appointment.  -Your surgeon would like to see you in the office next week    Please call: [phone] to make follow-up appointment     At:  [facility]   4 Floor, [address] of Medical office building  [address] [place] [address]        Medications:  Please take your pain medication with food.  You may use Tylenol or Motrin for pain instead of the narcotics if your pain is not significant.  Narcotic pain medication can be constipating.  If you experience constipation, try an over the counter stool softener.  -  Percocet 5/325.  Take 1 to 2 tablets by mouth every 4 to 6 hours as needed for pain.  - Ambien for sleep. Take as prescribed on bottle    Information:  DO NOT drive until seen by your surgeon.  You may resume your previous diet.  Always keep your dressings clean and dry.  DO NOT return to work until seen by your surgeon.  If you have any questions or concerns, please do not  hesitate to call the office.

## 2021-06-16 NOTE — ANESTHESIOLOGIST PRE-PROCEDURE ATTESTATION
Pre-Procedure Patient Identification:  I am the Primary Anesthesiologist and have identified the patient on 06/16/21 at 12:51 PM.   I have confirmed the following procedure(s) SURGICAL ARTHROSCOPY LEFT SHOULDER (L) will be performed by the following surgeon/proceduralist Yao Urbina DO.

## 2021-06-16 NOTE — BRIEF OP NOTE
SURGICAL ARTHROSCOPY LEFT SHOULDER, subacromial decrompression, partial bursectomy (L) Procedure Note    Procedure:    SURGICAL ARTHROSCOPY LEFT SHOULDER, subacromial decrompression, partial bursectomy  CPT(R) Code:  06008 - EMPERATRIZ ARTHRS SRG DECOMPRESSION      Pre-op Diagnosis     * Impingement syndrome of left shoulder [M75.42]       Post-op Diagnosis     * Impingement syndrome of left shoulder [M75.42]    Surgeon(s) and Role:     * Yao Urbina DO - Primary     * Nilson Buenrostro DO - Assisting    Anesthesia: General    Staff:   Circulator: Luz Soto RN  Scrub Person: Blank Greenfield RN    Procedure Details   See dictation    Estimated Blood Loss: No blood loss documented.    Specimens:                No specimens collected during this procedure.      Drains: * No LDAs found *    Implants: * No implants in log *           Complications:  None; patient tolerated the procedure well.           Disposition: PACU - hemodynamically stable.           Condition: stable    Yao Urbina DO  Phone Number: [phone]

## 2021-06-16 NOTE — OR SURGEON
I have seen, examined, and marked the patient's operative extremity. Laterality was confirmed with the patient and consent.

## 2021-06-16 NOTE — OP NOTE
REPORT TYPE: Operative Note    DATE OF OPERATION: 06/16/2021    PREOPERATIVE DIAGNOSIS:  Left shoulder impingement.    POSTOPERATIVE DIAGNOSIS:  Left shoulder impingement.    OPERATION:  1.  Diagnostic arthroscopy, left shoulder.  2.  Subacromial decompression with partial bursectomy, left shoulder.    ANESTHESIA:  General plus interscalene block.    DESCRIPTION OF PROCEDURE:  The patient was brought to the operating room, administered general anesthesia after an interscalene block to the left shoulder and then placed in beach chair on the operating room table.  The left shoulder was prepped with   ChloraPrep and double layer draping above and below the stockinette was accomplished sterilely.    A timeout was taken, verifying left shoulder for arthroscopic surgery with anesthesia, nursing, and surgical personnel, and verified that antibiotics had been given appropriately.    After a successful timeout, diagnostic arthroscopy was carried out from a posterior portal.  The glenohumeral joint was visualized, and there was no tear of the rotator cuff, biceps or glenoid labrum.  There was biceps tendinitis.    The arthroscope was taken from the glenohumeral joint and placed up into the subacromial space, where there was significant subacromial impingement and bursitis noted.  A lateral portal was fashioned outside in and partial bursectomy completed with   subacromial decompression.  Decompression carried to the AC joint and 1 cm medial to it.  There were no spurs.  The coracoacromial ligament was left intact anteriorly.  There was no rotator cuff tear seen from the bursal side.  After completing partial   bursectomy with subacromial decompression, the subacromial region was irrigated with 1000 mL of normal saline.  The 2 single arthroscopic portals were closed using single sutures of 3-0 nylon in a vertical mattress type manner, with 2 sutures laterally   and 1 posteriorly.  A sterile compression dressing was applied.   An arm sling was applied.  The patient was awoken from general anesthesia and taken from the operating room to the recovery room with stable vital signs.      Yao Urbina DO      CC:ERNIE SCOTT DO    DD: 06/16/2021 14:34  DT: 06/16/2021 14:36  Voice ID: [id number]/Report ID: [id number]  bw

## 2021-06-16 NOTE — ANESTHESIA PROCEDURE NOTES
Airway  Urgency: elective    Start Time: 6/16/2021 1:39 PM    General Information and Staff    Patient location during procedure: OR  Anesthesiologist: Elgin King MD  Resident/CRNA: Consuelo Doll CRNA    Indications and Patient Condition  Indications for airway management: anesthesia  Sedation level: general  Preoxygenated: yes  Patient position: sniffing  MILS maintained throughout  Mask difficulty assessment: 1 - vent by mask    Final Airway Details  Final airway type: endotracheal airway      Successful airway: ETT    Successful intubation technique: video laryngoscopy  Endotracheal tube insertion site: oral  Blade: Jason  Blade size: #3  ETT size (mm): 7.0  Cormack-Lehane Classification: grade I - full view of glottis  Placement verified by: chest auscultation and capnometry   Measured from: lips  ETT to lips (cm): 21  Number of attempts at approach: 1  Ventilation between attempts: none  Number of other approaches attempted: 0  Atraumatic airway insertion

## 2021-06-16 NOTE — ANESTHESIA POSTPROCEDURE EVALUATION
Patient: Chrissy Tomlinson    Procedure Summary     Date: 06/16/21 Room / Location: [facility]    Anesthesia Start: 1321 Anesthesia Stop: 1442    Procedure: SURGICAL ARTHROSCOPY LEFT SHOULDER, subacromial decrompression, partial bursectomy (Left Shoulder) Diagnosis:       Impingement syndrome of left shoulder      (IMPINGEMENT SYN  M75.42)    Surgeons: Yao Urbina DO Responsible Provider: Elgin King MD    Anesthesia Type: general ASA Status: 2          Anesthesia Type: general  PACU Vitals  6/16/2021 1430 - 6/16/2021 1530      6/16/2021  1438 6/16/2021  1445 6/16/2021  1500 6/16/2021  1515    BP:  125/76  124/76  125/75  129/74    Temp:  36.4 °C (97.5 °F)  --  --  --    Pulse:  74  68  70  68    Resp:  16  13  16  19    SpO2:  100 %  100 %  100 %  100 %              6/16/2021  1526             BP:  124/79       Temp:  36.2 °C (97.1 °F)       Pulse:  66       Resp:  17       SpO2:  100 %               Anesthesia Post Evaluation    Pain management: adequate  Mode of pain management: IV medication  Patient location during evaluation: PACU  Patient participation: complete - patient participated  Level of consciousness: awake and alert  Cardiovascular status: acceptable  Airway Patency: adequate  Respiratory status: acceptable  Hydration status: acceptable  Anesthetic complications: no

## 2021-06-17 LAB
BACTERIA UR CULT: NORMAL
BV SCORE VAG QL NAA+PROBE: NORMAL
C GLABRATA DNA VAG QL NAA+PROBE: NOT DETECTED
C PARAP DNA VAG QL NAA+PROBE: NOT DETECTED
C TRACH RRNA SPEC QL NAA+PROBE: NOT DETECTED
C TROPICLS DNA VAG QL NAA+PROBE: NOT DETECTED
CANDIDA DNA VAG QL NAA+PROBE: NOT DETECTED
G VAGINALIS DNA VAG NAA+PROBE-LOG#: <4.7 LOG (CELLS/ML)
MEGASPHAERA SP DNA VAG NAA+PROBE-LOG#: NOT DETECTED LOG CELLS/ML
N GONORRHOEA RRNA SPEC QL NAA+PROBE: NOT DETECTED
QUEST ATOPOBIUM VAGINAE: NOT DETECTED LOG CELLS/ML
QUEST LACTOBACILLUS SPECIES: 6.6 LOG (CELLS/ML)
T VAGINALIS RRNA SPEC QL NAA+PROBE: NOT DETECTED

## 2022-03-21 ENCOUNTER — TELEPHONE (OUTPATIENT)
Dept: OBSTETRICS AND GYNECOLOGY | Facility: HOSPITAL | Age: 37
End: 2022-03-21
Payer: COMMERCIAL

## 2022-03-21 ENCOUNTER — OFFICE VISIT (OUTPATIENT)
Dept: OBSTETRICS AND GYNECOLOGY | Facility: HOSPITAL | Age: 37
End: 2022-03-21
Payer: COMMERCIAL

## 2022-03-21 VITALS
SYSTOLIC BLOOD PRESSURE: 151 MMHG | OXYGEN SATURATION: 99 % | HEART RATE: 81 BPM | BODY MASS INDEX: 36.32 KG/M2 | WEIGHT: 205 LBS | TEMPERATURE: 97.3 F | DIASTOLIC BLOOD PRESSURE: 85 MMHG | HEIGHT: 63 IN

## 2022-03-21 DIAGNOSIS — N89.8 VAGINAL ITCHING: ICD-10-CM

## 2022-03-21 DIAGNOSIS — Z11.3 SCREENING EXAMINATION FOR SEXUALLY TRANSMITTED DISEASE: Primary | ICD-10-CM

## 2022-03-21 PROCEDURE — G0463 HOSPITAL OUTPT CLINIC VISIT: HCPCS | Performed by: ADVANCED PRACTICE MIDWIFE

## 2022-03-21 RX ORDER — METRONIDAZOLE 500 MG/1
500 TABLET ORAL 2 TIMES DAILY
Qty: 14 TABLET | Refills: 1 | Status: SHIPPED | OUTPATIENT
Start: 2022-03-21 | End: 2022-03-28

## 2022-03-21 RX ORDER — FLUCONAZOLE 150 MG/1
150 TABLET ORAL ONCE
Qty: 2 TABLET | Refills: 1 | Status: SHIPPED | OUTPATIENT
Start: 2022-03-21 | End: 2022-03-21

## 2022-03-21 NOTE — PROGRESS NOTES
38 yo  arrived 30 minutes late for appointment today, also is menstruating.  Complains of vaginal itching and irritation with slight fishy odor for past several days.  Is due for annual visit, requesting STD testing.    Pelvic: Deferred    Imp: Vaginal itching   STD testing    Plan:  · SureSwab collected, to lab for HIV and Syphillis Ab.  · Diflucan and Flagyl scripted to pharmacy.  · RTO for annual visit.

## 2022-03-27 LAB
BV SCORE VAG QL NAA+PROBE: ABNORMAL
C GLABRATA DNA VAG QL NAA+PROBE: NOT DETECTED
C PARAP DNA VAG QL NAA+PROBE: NOT DETECTED
C TRACH RRNA SPEC QL NAA+PROBE: NOT DETECTED
C TROPICLS DNA VAG QL NAA+PROBE: NOT DETECTED
CANDIDA DNA VAG QL NAA+PROBE: DETECTED
G VAGINALIS DNA VAG NAA+PROBE-LOG#: <4.7 LOG (CELLS/ML)
MEGASPHAERA SP DNA VAG NAA+PROBE-LOG#: NOT DETECTED LOG CELLS/ML
N GONORRHOEA RRNA SPEC QL NAA+PROBE: NOT DETECTED
QUEST ATOPOBIUM VAGINAE: NOT DETECTED LOG CELLS/ML
QUEST LACTOBACILLUS SPECIES: NOT DETECTED LOG CELLS/ML
T VAGINALIS RRNA SPEC QL NAA+PROBE: DETECTED

## 2022-03-28 ENCOUNTER — TELEPHONE (OUTPATIENT)
Dept: OBSTETRICS AND GYNECOLOGY | Facility: HOSPITAL | Age: 37
End: 2022-03-28
Payer: COMMERCIAL

## 2022-03-28 NOTE — TELEPHONE ENCOUNTER
Notified of NuSwab results, Flagyl and Diflucan alreadyscripted. Partner treatment, safer sex and rescreening discussed.

## 2022-03-30 PROBLEM — Z01.419 WELL WOMAN EXAM WITH ROUTINE GYNECOLOGICAL EXAM: Status: ACTIVE | Noted: 2022-03-30

## 2022-03-31 ENCOUNTER — APPOINTMENT (EMERGENCY)
Dept: RADIOLOGY | Facility: HOSPITAL | Age: 37
End: 2022-03-31
Payer: COMMERCIAL

## 2022-03-31 ENCOUNTER — OFFICE VISIT (OUTPATIENT)
Dept: OBSTETRICS AND GYNECOLOGY | Facility: HOSPITAL | Age: 37
End: 2022-03-31
Payer: COMMERCIAL

## 2022-03-31 ENCOUNTER — HOSPITAL ENCOUNTER (EMERGENCY)
Facility: HOSPITAL | Age: 37
Discharge: HOME | End: 2022-03-31
Attending: EMERGENCY MEDICINE
Payer: COMMERCIAL

## 2022-03-31 VITALS
HEIGHT: 63 IN | DIASTOLIC BLOOD PRESSURE: 89 MMHG | OXYGEN SATURATION: 99 % | HEART RATE: 95 BPM | SYSTOLIC BLOOD PRESSURE: 160 MMHG | TEMPERATURE: 97.2 F | BODY MASS INDEX: 36.84 KG/M2 | WEIGHT: 207.9 LBS

## 2022-03-31 VITALS
OXYGEN SATURATION: 100 % | DIASTOLIC BLOOD PRESSURE: 94 MMHG | RESPIRATION RATE: 16 BRPM | HEIGHT: 63 IN | WEIGHT: 207 LBS | SYSTOLIC BLOOD PRESSURE: 149 MMHG | BODY MASS INDEX: 36.68 KG/M2 | TEMPERATURE: 98.1 F | HEART RATE: 70 BPM

## 2022-03-31 DIAGNOSIS — S09.90XA HEAD INJURY, INITIAL ENCOUNTER: ICD-10-CM

## 2022-03-31 DIAGNOSIS — Z01.419 WELL WOMAN EXAM WITH ROUTINE GYNECOLOGICAL EXAM: Primary | ICD-10-CM

## 2022-03-31 DIAGNOSIS — M54.6 ACUTE THORACIC BACK PAIN, UNSPECIFIED BACK PAIN LATERALITY: ICD-10-CM
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
## 2022-03-31 DIAGNOSIS — S16.1XXA CERVICAL STRAIN, ACUTE, INITIAL ENCOUNTER: ICD-10-CM

## 2022-03-31 LAB
ANION GAP SERPL CALC-SCNC: 10 MEQ/L (ref 3–15)
ATRIAL RATE: 75
B-HCG UR QL: NEGATIVE
BASOPHILS # BLD: 0.03 K/UL (ref 0.01–0.1)
BASOPHILS NFR BLD: 0.3 %
BUN SERPL-MCNC: 7 MG/DL (ref 8–20)
CALCIUM SERPL-MCNC: 9.2 MG/DL (ref 8.9–10.3)
CHLORIDE SERPL-SCNC: 103 MEQ/L (ref 98–109)
CO2 SERPL-SCNC: 25 MEQ/L (ref 22–32)
CREAT SERPL-MCNC: 0.8 MG/DL (ref 0.6–1.1)
DIFFERENTIAL METHOD BLD: ABNORMAL
EOSINOPHIL # BLD: 0.17 K/UL (ref 0.04–0.36)
EOSINOPHIL NFR BLD: 1.8 %
ERYTHROCYTE [DISTWIDTH] IN BLOOD BY AUTOMATED COUNT: 16.1 % (ref 11.7–14.4)
GFR SERPL CREATININE-BSD FRML MDRD: >60 ML/MIN/1.73M*2
GLUCOSE BLD-MCNC: 79 MG/DL (ref 70–99)
GLUCOSE SERPL-MCNC: 84 MG/DL (ref 70–99)
HCG UR QL: NEGATIVE
HCT VFR BLDCO AUTO: 35.8 % (ref 35–45)
HGB BLD-MCNC: 11.2 G/DL (ref 11.8–15.7)
IMM GRANULOCYTES # BLD AUTO: 0.03 K/UL (ref 0–0.08)
IMM GRANULOCYTES NFR BLD AUTO: 0.3 %
LYMPHOCYTES # BLD: 3.14 K/UL (ref 1.2–3.5)
LYMPHOCYTES NFR BLD: 32.4 %
MCH RBC QN AUTO: 23.8 PG (ref 28–33.2)
MCHC RBC AUTO-ENTMCNC: 31.3 G/DL (ref 32.2–35.5)
MCV RBC AUTO: 76.2 FL (ref 83–98)
MONOCYTES # BLD: 0.64 K/UL (ref 0.28–0.8)
MONOCYTES NFR BLD: 6.6 %
NEUTROPHILS # BLD: 5.68 K/UL (ref 1.7–7)
NEUTS SEG NFR BLD: 58.6 %
NRBC BLD-RTO: 0 %
P AXIS: 54
PDW BLD AUTO: 9.6 FL (ref 9.4–12.3)
PLATELET # BLD AUTO: 367 K/UL (ref 150–369)
POCT TEST: NORMAL
POCT TEST: NORMAL
POTASSIUM SERPL-SCNC: 4.2 MEQ/L (ref 3.6–5.1)
PR INTERVAL: 168
QRS DURATION: 88
QT INTERVAL: 378
QTC CALCULATION(BAZETT): 422
R AXIS: 1
RBC # BLD AUTO: 4.7 M/UL (ref 3.93–5.22)
SODIUM SERPL-SCNC: 138 MEQ/L (ref 136–144)
T WAVE AXIS: 34
VENTRICULAR RATE: 75
WBC # BLD AUTO: 9.69 K/UL (ref 3.8–10.5)

## 2022-03-31 PROCEDURE — 99284 EMERGENCY DEPT VISIT MOD MDM: CPT | Mod: 25

## 2022-03-31 PROCEDURE — 72125 CT NECK SPINE W/O DYE: CPT | Mod: ME

## 2022-03-31 PROCEDURE — 85025 COMPLETE CBC W/AUTO DIFF WBC: CPT | Performed by: PHYSICIAN ASSISTANT

## 2022-03-31 PROCEDURE — 63700000 HC SELF-ADMINISTRABLE DRUG: Performed by: PHYSICIAN ASSISTANT

## 2022-03-31 PROCEDURE — 70450 CT HEAD/BRAIN W/O DYE: CPT | Mod: MF

## 2022-03-31 PROCEDURE — 93005 ELECTROCARDIOGRAM TRACING: CPT | Performed by: PHYSICIAN ASSISTANT

## 2022-03-31 PROCEDURE — 93010 ELECTROCARDIOGRAM REPORT: CPT | Performed by: INTERNAL MEDICINE

## 2022-03-31 PROCEDURE — 3E0333Z INTRODUCTION OF ANTI-INFLAMMATORY INTO PERIPHERAL VEIN, PERCUTANEOUS APPROACH: ICD-10-PCS | Performed by: EMERGENCY MEDICINE

## 2022-03-31 PROCEDURE — 80048 BASIC METABOLIC PNL TOTAL CA: CPT | Performed by: PHYSICIAN ASSISTANT

## 2022-03-31 PROCEDURE — 84703 CHORIONIC GONADOTROPIN ASSAY: CPT | Performed by: PHYSICIAN ASSISTANT

## 2022-03-31 PROCEDURE — 36415 COLL VENOUS BLD VENIPUNCTURE: CPT | Performed by: PHYSICIAN ASSISTANT

## 2022-03-31 PROCEDURE — 72070 X-RAY EXAM THORAC SPINE 2VWS: CPT

## 2022-03-31 PROCEDURE — G0463 HOSPITAL OUTPT CLINIC VISIT: HCPCS

## 2022-03-31 PROCEDURE — 63600000 HC DRUGS/DETAIL CODE: Performed by: PHYSICIAN ASSISTANT

## 2022-03-31 PROCEDURE — 81025 URINE PREGNANCY TEST: CPT

## 2022-03-31 RX ORDER — NAPROXEN 500 MG/1
500 TABLET ORAL 2 TIMES DAILY WITH MEALS
Qty: 20 TABLET | Refills: 0 | Status: SHIPPED | OUTPATIENT
Start: 2022-03-31 | End: 2022-04-05

## 2022-03-31 RX ORDER — AZITHROMYCIN 250 MG/1
TABLET, FILM COATED ORAL
COMMUNITY
Start: 2021-12-23

## 2022-03-31 RX ORDER — KETOROLAC TROMETHAMINE 15 MG/ML
15 INJECTION, SOLUTION INTRAMUSCULAR; INTRAVENOUS ONCE
Status: COMPLETED | OUTPATIENT
Start: 2022-03-31 | End: 2022-03-31

## 2022-03-31 RX ORDER — LIDOCAINE 560 MG/1
1 PATCH PERCUTANEOUS; TOPICAL; TRANSDERMAL ONCE
Status: DISCONTINUED | OUTPATIENT
Start: 2022-03-31 | End: 2022-03-31 | Stop reason: HOSPADM

## 2022-03-31 RX ORDER — LIDOCAINE 560 MG/1
1 PATCH PERCUTANEOUS; TOPICAL; TRANSDERMAL DAILY
Qty: 30 PATCH | Refills: 0 | Status: SHIPPED | OUTPATIENT
Start: 2022-03-31 | End: 2022-04-05

## 2022-03-31 RX ORDER — ACETAMINOPHEN 325 MG/1
650 TABLET ORAL ONCE
Status: COMPLETED | OUTPATIENT
Start: 2022-03-31 | End: 2022-03-31

## 2022-03-31 RX ORDER — CYCLOBENZAPRINE HCL 10 MG
10 TABLET ORAL 2 TIMES DAILY PRN
Qty: 10 TABLET | Refills: 0 | Status: SHIPPED | OUTPATIENT
Start: 2022-03-31 | End: 2022-04-07

## 2022-03-31 RX ADMIN — ACETAMINOPHEN 650 MG: 325 TABLET ORAL at 12:38

## 2022-03-31 RX ADMIN — KETOROLAC TROMETHAMINE 15 MG: 15 INJECTION, SOLUTION INTRAMUSCULAR; INTRAVENOUS at 13:46

## 2022-03-31 ASSESSMENT — ENCOUNTER SYMPTOMS
HEMATURIA: 0
DYSURIA: 0
LIGHT-HEADEDNESS: 0
NAUSEA: 0
SYNCOPE: 1
DIARRHEA: 0
DIZZINESS: 0
WEAKNESS: 0
HEADACHES: 1
BLOOD IN STOOL: 0
WOUND: 0
SHORTNESS OF BREATH: 0
VOMITING: 0
JOINT SWELLING: 0
PHOTOPHOBIA: 0
ABDOMINAL PAIN: 0
FREQUENCY: 0
NUMBNESS: 0
NECK PAIN: 1
ARTHRALGIAS: 0
BACK PAIN: 1
COLOR CHANGE: 0

## 2022-03-31 ASSESSMENT — PAIN SCALES - GENERAL: PAINLEVEL: 8

## 2022-03-31 NOTE — ED ATTESTATION NOTE
The patient was evaluated and managed by the physician assistant.    DO Rivera Banuelos, Fiorella Eisenberg DO  03/31/22 1821

## 2022-03-31 NOTE — ED PROVIDER NOTES
Emergency Medicine Note  HPI   HISTORY OF PRESENT ILLNESS     37-year-old female with PMH of asthma and migraines presents emergency department for evaluation after fall PTA.  Patient was in emergency response.  Patient states that she was leaving her doctor's appointment when she was walking down the steps.  Patient states that she somehow slipped on the steps and fell backwards hitting her head.  Patient is unsure if she had any LOC.  Patient is admitting to headache, neck pain, and upper back pain.  Patient denies any numbness or tingling.  Patient denies any bowel or bladder incontinence or retention.  She states headache is a constant pain to the occipital scalp.  Patient denies lightheadedness or dizziness prior to or after the fall.  Patient denies visual changes.  Patient denies chest pain, shortness of breath, abdominal pain, nausea, vomiting, dysuria, urinary frequency, urgency, hematuria, diarrhea, hematochezia, or melena.      History provided by:  Patient   used: No    Syncope  Associated symptoms: headaches    Associated symptoms: no chest pain, no dizziness, no nausea, no shortness of breath, no vomiting and no weakness    Trauma  Mechanism of injury: fall     Current symptoms:       Associated symptoms:             Reports back pain, headache and neck pain.             Denies abdominal pain, chest pain, nausea and vomiting.         Patient History   PAST HISTORY     Reviewed from Nursing Triage:         Past Medical History:   Diagnosis Date   • Asthma    • Migraines    • Seasonal allergies        Past Surgical History:   Procedure Laterality Date   • LAPAROTOMY EXPLORATORY  2005       Family History   Problem Relation Age of Onset   • Diabetes Biological Mother    • COPD Biological Father    • Heart disease Biological Father        Social History     Tobacco Use   • Smoking status: Current Some Day Smoker   • Smokeless tobacco: Never Used   • Tobacco comment: Hookah   Substance  Use Topics   • Alcohol use: Yes     Comment: Occasionally/socially   • Drug use: Not Currently         Review of Systems   REVIEW OF SYSTEMS     Review of Systems   Eyes: Negative for photophobia and visual disturbance.   Respiratory: Negative for shortness of breath.    Cardiovascular: Positive for syncope. Negative for chest pain.   Gastrointestinal: Negative for abdominal pain, blood in stool, diarrhea, nausea and vomiting.   Genitourinary: Negative for dysuria, frequency and hematuria.   Musculoskeletal: Positive for back pain and neck pain. Negative for arthralgias and joint swelling.   Skin: Negative for color change, rash and wound.   Neurological: Positive for headaches. Negative for dizziness, syncope, weakness, light-headedness and numbness.         VITALS     ED Vitals    Date/Time Temp Pulse Resp BP SpO2 [facility]   03/31/22 1423 -- 70 16 149/94 100 % TNC   03/31/22 1213 36.7 °C (98.1 °F) 81 16 158/83 99 % JL        Pulse Ox %: 99 % (03/31/22 1215)  Pulse Ox Interpretation: Normal (03/31/22 1215)  Heart Rate: 81 (03/31/22 1215)  Rhythm Strip Interpretation: Normal Sinus Rhythm (03/31/22 1215)     Physical Exam   PHYSICAL EXAM     Physical Exam  Vitals and nursing note reviewed.   Constitutional:       General: She is not in acute distress.     Appearance: Normal appearance. She is well-developed. She is not ill-appearing or diaphoretic.   HENT:      Head: Normocephalic and atraumatic.   Eyes:      Extraocular Movements: Extraocular movements intact.      Conjunctiva/sclera: Conjunctivae normal.      Pupils: Pupils are equal, round, and reactive to light.   Cardiovascular:      Rate and Rhythm: Normal rate and regular rhythm.      Pulses: Normal pulses.      Heart sounds: Normal heart sounds.      Comments: 2+ radialis and dorsalis pedis pulses bilaterally  Pulmonary:      Effort: Pulmonary effort is normal. No respiratory distress.      Breath sounds: Normal breath sounds. No decreased breath sounds, wheezing,  rhonchi or rales.   Musculoskeletal:         General: Tenderness present. No swelling. Normal range of motion.      Cervical back: Normal range of motion.      Comments: Patient has midline thoracic and cervical spinal tenderness palpation.  Patient diffusely tender to palpation of the posterior neck and thoracic back.  No midline lumbar tenderness palpation.  Patient is full range of motion of her upper and lower extremities bilaterally.   Skin:     General: Skin is warm and dry.      Capillary Refill: Capillary refill takes less than 2 seconds.   Neurological:      General: No focal deficit present.      Mental Status: She is alert and oriented to person, place, and time.      Cranial Nerves: No cranial nerve deficit.      Motor: No weakness.      Coordination: Coordination normal.      Comments: Cranial nerves II through XII grossly intact.  Finger-to-nose intact.  No pronator drift in upper or lower extremities.  Speech grossly normal.    Strength is 5/5 in the upper extremities bilaterally with  strength, shoulder flexion, shoulder extension, shoulder abduction, and shoulder adduction.  Strength is 5/5 in lower extremities bilaterally with hip flexion, hip extension, hip abduction, hip adduction, plantar and dorsiflexion.     Psychiatric:         Mood and Affect: Mood normal.           PROCEDURES     Procedures     DATA     Results     Procedure Component Value Units Date/Time    BhCG, Serum, Qual [[id number]]  (Normal) Collected: 03/31/22 1220    Specimen: Blood, Venous Updated: 03/31/22 1315     Preg Test, Serum Negative    Basic metabolic panel [[id number]]  (Abnormal) Collected: 03/31/22 1220    Specimen: Blood, Venous Updated: 03/31/22 1310     Sodium 138 mEQ/L      Potassium 4.2 mEQ/L      Comment: Results obtained on plasma. Plasma Potassium values may be up to 0.4 mEQ/L less than serum values. The differences may be greater for patients with high platelet or white cell counts.        Chloride 103  mEQ/L      CO2 25 mEQ/L      BUN 7 mg/dL      Creatinine 0.8 mg/dL      Glucose 84 mg/dL      Calcium 9.2 mg/dL      eGFR >60.0 mL/min/1.73m*2      Anion Gap 10 mEQ/L     CBC and differential [[id number]]  (Abnormal) Collected: 03/31/22 1220    Specimen: Blood, Venous Updated: 03/31/22 1251     WBC 9.69 K/uL      RBC 4.70 M/uL      Hemoglobin 11.2 g/dL      Hematocrit 35.8 %      MCV 76.2 fL      MCH 23.8 pg      MCHC 31.3 g/dL      RDW 16.1 %      Platelets 367 K/uL      MPV 9.6 fL      Differential Type Auto     nRBC 0.0 %      Immature Granulocytes 0.3 %      Neutrophils 58.6 %      Lymphocytes 32.4 %      Monocytes 6.6 %      Eosinophils 1.8 %      Basophils 0.3 %      Immature Granulocytes, Absolute 0.03 K/uL      Neutrophils, Absolute 5.68 K/uL      Lymphocytes, Absolute 3.14 K/uL      Monocytes, Absolute 0.64 K/uL      Eosinophils, Absolute 0.17 K/uL      Basophils, Absolute 0.03 K/uL     [facility] [[id number]] Collected: 03/31/22 1220    Specimen: Blood, Venous Updated: 03/31/22 1237    Narrative:      The following orders were created for panel order [facility].  Procedure                               Abnormality         Status                     ---------                               -----------         ------                     RAINBOW GOLD[[id number]]                                     In process                   Please view results for these tests on the individual orders.    RAINBOW GOLD [[id number]] Collected: 03/31/22 1220    Specimen: Blood, Venous Updated: 03/31/22 1237          Imaging Results          X-RAY THORACIC SPINE 2 VIEWS (Final result)  Result time 03/31/22 13:44:25    Final result                 Impression:    IMPRESSION:  No definite acute fracture or dislocation.  If there is continued clinical  concern this can be further evaluated with CT or MRI.               Narrative:    CLINICAL HISTORY: Fall.    COMMENT: AP, lateral, and swimmers view of the thoracic spine  demonstrate the  vertebral bodies be well aligned. No fracture or degenerative changes are  identified. There is no evidence for bone destruction.                               CT HEAD WITHOUT IV CONTRAST (Final result)  Result time 03/31/22 13:20:07    Final result                 Impression:    IMPRESSION:  1.  No evidence of acute intracranial pathology or calvarial fracture.  2.  No fracture or dislocation of the cervical spine.                 Narrative:    CLINICAL HISTORY:Headache and neck pain status post slip and fall wall walking  down steps.  The patient struck the posterior aspect of the head.  The patient  is unsure whether she lost consciousness.    COMMENT:Noncontrast CT of the brain and cervical spine was performed 3/31/2022.  There are no previous studies for comparison.    CT DOSE:  One or more dose reduction techniques (e.g. automated exposure  control, adjustment of the mA and/or kV according to patient size, use of  iterative reconstruction technique) utilized for this examination.    Brain: The cortical sulci and ventricles are normal in caliber.  No acute  intraparenchymal or extra-axial hemorrhage is demonstrated.  No extra-axial  collections are demonstrated and there is no midline shift.  No mass lesions are  demonstrated and there is no mass effect within the brain.    Visualized portions of the paranasal sinuses and mastoid air cells are clear.  No calvarial fractures are demonstrated.  There appears to be mild soft tissue  swelling/stranding throughout the deep subcutaneous fat of the occipital scalp.    Cervical spine: There is nonspecific straightening of the cervical lordosis  which may be positional in nature.  The occipital condyles are properly aligned  upon the lateral masses of C1.  The ring of C1 is intact.  The odontoid process  and base are intact.  The C2-T1 vertebra are normal in height and alignment.  The facet joints are normal in alignment bilaterally.  There is  degenerative  disc space narrowing and spondylosis at C5-C6.  Evaluation for cervical disc  pathology is limited on CT in the absence of intrathecal contrast material.  There is no bony cause of canal stenosis.    The visualized lung apices are clear.  Visualized soft tissue structures of the  neck are unremarkable.                               CT CERVICAL SPINE WITHOUT IV CONTRAST (Final result)  Result time 03/31/22 13:20:07    Final result                 Impression:    IMPRESSION:  1.  No evidence of acute intracranial pathology or calvarial fracture.  2.  No fracture or dislocation of the cervical spine.                 Narrative:    CLINICAL HISTORY:Headache and neck pain status post slip and fall wall walking  down steps.  The patient struck the posterior aspect of the head.  The patient  is unsure whether she lost consciousness.    COMMENT:Noncontrast CT of the brain and cervical spine was performed 3/31/2022.  There are no previous studies for comparison.    CT DOSE:  One or more dose reduction techniques (e.g. automated exposure  control, adjustment of the mA and/or kV according to patient size, use of  iterative reconstruction technique) utilized for this examination.    Brain: The cortical sulci and ventricles are normal in caliber.  No acute  intraparenchymal or extra-axial hemorrhage is demonstrated.  No extra-axial  collections are demonstrated and there is no midline shift.  No mass lesions are  demonstrated and there is no mass effect within the brain.    Visualized portions of the paranasal sinuses and mastoid air cells are clear.  No calvarial fractures are demonstrated.  There appears to be mild soft tissue  swelling/stranding throughout the deep subcutaneous fat of the occipital scalp.    Cervical spine: There is nonspecific straightening of the cervical lordosis  which may be positional in nature.  The occipital condyles are properly aligned  upon the lateral masses of C1.  The ring of C1 is  intact.  The odontoid process  and base are intact.  The C2-T1 vertebra are normal in height and alignment.  The facet joints are normal in alignment bilaterally.  There is degenerative  disc space narrowing and spondylosis at C5-C6.  Evaluation for cervical disc  pathology is limited on CT in the absence of intrathecal contrast material.  There is no bony cause of canal stenosis.    The visualized lung apices are clear.  Visualized soft tissue structures of the  neck are unremarkable.                                ECG 12 lead             Scoring tools                                 ED Course & MDM   MDM / ED COURSE / CLINICAL IMPRESSIONS / DISPO     MDM  Number of Diagnoses or Management Options  Diagnosis management comments: 37-year-old female with PMH of asthma and migraines presents emergency department for evaluation after fall PTA.  Differential diagnosis is broad.  Will get CT of head and cervical spine.  Will get x-ray of the thoracic spine.  Will get basic labs and EKG.  Will give Tylenol, serum qualitative beta-hCG, and place lidocaine patch then reevaluate.       Amount and/or Complexity of Data Reviewed  Clinical lab tests: reviewed  Tests in the medicine section of CPT®: reviewed        ED Course as of 03/31/22 1424   Thu Mar 31, 2022   1329 CBC and BMP stable.  Serum qualitative beta-hCG is negative.  CT of head and cervical spine do not reveal any acute pathology.  X-ray of thoracic spine is pending.  EKG does not reveal any acute pathology.  Will reevaluate patient. [KM]   1334 Patient only admits to mild relief of pain.  Will give Toradol then reevaluate. [KM]   1347 X-ray thoracic spine does not reveal any pathology.  Patient is moving around the bed independently without any apparent acute discomfort.  Strength is intact in the upper lower extremities bilaterally.  Will reevaluate patient after pain medication. [KM]   1418 Patient is lying in bed on the phone in no acute distress.  Patient  admits to mild improvement of symptoms.  Patient states she just wants to go home.  Patient able ambulate with steady gait.  Will give prescription for naproxen, lidocaine patches, and Flexeril.  Concussion precautions given. Vital signs are stable, pt discharged home. Pt given strict precautions to return to the ED. Pt to follow-up with PCP in 2 days as well as any other providers as indicated.  [KM]      ED Course User Index  [KM] Sarah Gilbert PA C         Clinical Impressions as of 03/31/22 1424   Fall, initial encounter   Head injury, initial encounter   Acute thoracic back pain, unspecified back pain laterality   Cervical strain, acute, initial encounter     Discharge         Sarah Gilbert PA C  03/31/22 1429

## 2022-03-31 NOTE — PROGRESS NOTES
"38 yo female presents today for her annual exam. However on her way to the office she slipped and fell on the stairs in the patient parking garage, Reports that she  hit her head and neck. She does not think she lost consciousness.  While Ms. Tomlinson wanted to have her exam completed , she appeared to be in quite a bit of discomfort.   When she reported increased neck and head pain,  became  Unsteady on her feet and displayed altered consciousness ( closed eyes, stating the she was extremely sleepy) an emergency response was called and patient was transported to the ER.    Vitals:    03/31/22 1122   BP: (!) 160/89   BP Location: Left upper arm   Patient Position: Sitting   Pulse: 95   Temp: 36.2 °C (97.2 °F)   TempSrc: Temporal   SpO2: 99%   Weight: 94.3 kg (207 lb 14.4 oz)   Height: 1.6 m (5' 3\")     Pain level: 7    We will call patient tomorrow to reschedule her visit  "

## 2022-03-31 NOTE — DISCHARGE INSTRUCTIONS
Please follow concussion precautions as given.    You can use heat and/or ice in 20-minute increments as directed as needed to help with symptoms.    You can take Flexeril as prescribed as needed help with symptoms.  Do not drive when taking.    You can take naproxen as prescribed as needed for pain every 12 hours.  Do not take any other NSAIDs when taking.    You can place lidocaine patches over the area where you are having pain as needed every 12 hours.    Please follow up as directed to obtain any final results and review your plan of care. CALL your primary doctor's office today to schedule a follow up appointment within the next 48 hours.       REVIEW AND DISCUSS ALL OF YOUR MEDICATIONS WITH YOUR PRIMARY CARE TEAM WITHIN THE NEXT 2 DAYS, even ones that you may have been on for a long time.      Radiology review of your studies (XRAYs, CT Scans, Ultrasounds, MRI scans) may still be pending. Preliminary results may be available today but final written reports may not be available until tomorrow. Important findings may be included in the final radiology review.       If instructed please CALL the Emergency Department at [phone] to obtain the final results within the next 24 hours. Review the final results of all of your tests with your primary care doctor within the next 2 days.      Cultures and uncommon labs may take 2 days or more before results are available. Please ask about all pending tests until the final results are known. You may not be notified of important test results unless you ask for these when you call or when you follow up.      **PLEASE RETURN TO THE EMERGENCY DEPARTMENT IF YOU DEVELOP ANY NEW OR WORSENING SYMPTOMS**

## 2022-03-31 NOTE — Clinical Note
Please call this patient tomorrow to reschedule her annual appointment. Delores to ER today- emergency response. Thanks

## 2022-08-02 ENCOUNTER — RX ONLY (OUTPATIENT)
Age: 37
Setting detail: RX ONLY
End: 2022-08-02

## 2022-08-02 ENCOUNTER — APPOINTMENT (RX ONLY)
Dept: URBAN - METROPOLITAN AREA CLINIC 28 | Facility: CLINIC | Age: 37
Setting detail: DERMATOLOGY
End: 2022-08-02

## 2022-08-02 DIAGNOSIS — L73.2 HIDRADENITIS SUPPURATIVA: ICD-10-CM | Status: STABLE

## 2022-08-02 PROCEDURE — ? COUNSELING

## 2022-08-02 PROCEDURE — ? PHOTO-DOCUMENTATION

## 2022-08-02 PROCEDURE — 99213 OFFICE O/P EST LOW 20 MIN: CPT

## 2022-08-02 PROCEDURE — ? PRESCRIPTION MEDICATION MANAGEMENT

## 2022-08-02 RX ORDER — CLINDAMYCIN PHOSPHATE 10 MG/ML
SOLUTION TOPICAL BID
Qty: 60 | Refills: 6 | Status: ERX | COMMUNITY
Start: 2022-08-02

## 2022-08-02 RX ORDER — BENZOYL PEROXIDE 100 MG/G
LOTION TOPICAL QDAY
Qty: 237 | Refills: 6 | Status: ERX | COMMUNITY
Start: 2022-08-02

## 2022-08-02 RX ORDER — DOXYCYCLINE 100 MG/1
CAPSULE ORAL DAILY
Qty: 30 | Refills: 3 | Status: ERX | COMMUNITY
Start: 2022-08-02

## 2022-08-02 ASSESSMENT — LOCATION DETAILED DESCRIPTION DERM
LOCATION DETAILED: RIGHT AXILLARY VAULT
LOCATION DETAILED: LEFT SUPRAPUBIC SKIN
LOCATION DETAILED: LEFT AXILLARY VAULT
LOCATION DETAILED: RIGHT SUPRAPUBIC SKIN

## 2022-08-02 ASSESSMENT — LOCATION SIMPLE DESCRIPTION DERM
LOCATION SIMPLE: LEFT AXILLARY VAULT
LOCATION SIMPLE: GROIN
LOCATION SIMPLE: RIGHT AXILLARY VAULT

## 2022-08-02 ASSESSMENT — LOCATION ZONE DERM
LOCATION ZONE: TRUNK
LOCATION ZONE: AXILLAE

## 2022-08-02 NOTE — PROCEDURE: PRESCRIPTION MEDICATION MANAGEMENT
Detail Level: Zone
Render In Strict Bullet Format?: No
Continue Regimen: (restart)\\nbenzoyl peroxide 10% topical cleanser: Wash AA of body QDAY during shower\\nclindamycin phosphate 1% topical swab: Wipe AA of body after shower QDAY\\ndoxycycline monohydrate 100mg capsule: Take one capsule PO QDAY with food

## 2022-08-22 ENCOUNTER — OFFICE VISIT (OUTPATIENT)
Dept: OBSTETRICS AND GYNECOLOGY | Facility: HOSPITAL | Age: 37
End: 2022-08-22
Payer: COMMERCIAL

## 2022-08-22 VITALS
DIASTOLIC BLOOD PRESSURE: 72 MMHG | SYSTOLIC BLOOD PRESSURE: 154 MMHG | BODY MASS INDEX: 36.84 KG/M2 | HEART RATE: 88 BPM | OXYGEN SATURATION: 100 % | WEIGHT: 207.9 LBS | TEMPERATURE: 97 F | HEIGHT: 63 IN

## 2022-08-22 DIAGNOSIS — N93.9 ABNORMAL UTERINE BLEEDING: ICD-10-CM

## 2022-08-22 DIAGNOSIS — Z11.3 ROUTINE SCREENING FOR STI (SEXUALLY TRANSMITTED INFECTION): Primary | ICD-10-CM

## 2022-08-22 PROBLEM — Z01.419 WELL WOMAN EXAM WITH ROUTINE GYNECOLOGICAL EXAM: Status: RESOLVED | Noted: 2022-03-30 | Resolved: 2022-08-22

## 2022-08-22 PROCEDURE — 99900024 HB NONBILLABLE HOSPITAL CLINIC SERVICE

## 2022-08-22 ASSESSMENT — PAIN SCALES - GENERAL: PAINLEVEL: 8

## 2022-08-22 NOTE — PROGRESS NOTES
Annual Gynecology Outpatient Visit     Visit Date: 2022     Chrissy Tomlinson is 37 y.o. female presenting today for annual exam. Lower abdominal pain or cramping for past 4 months     HPI: Patient reports a 3-4 month history of aching lower abdominal pain and cramping. She reports this constant and nothing aggravates or alleviates the pain. She has tried tylenol and motrin which provide minimal relief. She denies fevers, chills, CP, SOB,  N/V, diarrhea/constipation, dyspepsia, relation to diet, urinary symptoms, changes in appetite/weight. She denies vaginal symptoms (discharge, irration), not currently sexually active.     Gyn History:   LMP 20  Menses: Patient reports history of irregular cycles, every 21-56 days, menses last 5-10 days with 3-6 days of heavy bleeding.     STDs: Remote history of gonorrhea, chlamydia and trichomonas   Contraception: None   Pap: Last 19 wnl   Fibroids/ovarian cysts: Denies   Perimenopausal symptoms: Denies   Sex: Not currently active     Health Screening:  Mammogram: Due in February   Colonoscopy: Due at age 43   DV screening: Denies   Family history: Denies family history of breast, ovarian, endometrial, colon cancer     OB History:   OB History    Para Term  AB Living   9 5 5 0 4 5   SAB IAB Ectopic Multiple Live Births   2 2 0 0 0      # Outcome Date GA Lbr Moy/2nd Weight Sex Delivery Anes PTL Lv   9 Term            8 Term            7 Term            6 Term            5 Term            4 SAB            3 SAB            2 IAB            1 IAB              5 Full term vaginal deliveries in past. 4 were delivered at [facility], reports they were all uncomplicated. Last delivery was precipitious at home.     Medical History:   Past Medical History:   Diagnosis Date   • Asthma    • Migraines    • Seasonal allergies      Surgical History:   Past Surgical History:   Procedure Laterality Date   • DIAGNOSTIC LAPAROSCOPY       Social History:   Social History      Socioeconomic History   • Marital status: Single     Spouse name: None   • Number of children: 5   • Years of education: None   • Highest education level: None   Tobacco Use   • Smoking status: Current Some Day Smoker   • Smokeless tobacco: Never Used   • Tobacco comment: Hookah   Substance and Sexual Activity   • Alcohol use: Yes     Comment: Occasionally/socially   • Drug use: Not Currently   • Sexual activity: Yes     Partners: Male     Birth control/protection: None   Social History Narrative    Lives with family in a 2 story home     Social Determinants of Health     Food Insecurity: No Food Insecurity   • Worried About Running Out of Food in the Last Year: Never true   • Ran Out of Food in the Last Year: Never true      Family History:   Family History   Problem Relation Age of Onset   • Diabetes Biological Mother    • COPD Biological Father    • Heart disease Biological Father      Allergies:   Latex and Pollen extracts    Prior to Admission medications    Medication Sig Start Date End Date Taking? Authorizing Provider   albuterol HFA (VENTOLIN HFA) 90 mcg/actuation inhaler Inhale 2 puffs as needed.   2/27/19  Yes ProviderDanilo MD   cetirizine (ZYRTEC) 10 mg capsule Take 10 mg by mouth daily. 9/28/18  Yes ProviderDanilo MD   fluticasone propionate (FLONASE) 50 mcg/actuation nasal spray Administer 1 spray into each nostril daily as needed.   9/28/18  Yes ProviderDanilo MD   azithromycin (ZITHROMAX) 250 mg tablet take 2 tablets by mouth on day 1 IN ONE DOSE then 1 tablet on days 2 through 5 12/23/21   ProviderDanilo MD   cyclobenzaprine (FLEXERIL) 10 mg tablet Take 1 tablet (10 mg total) by mouth 2 (two) times a day as needed for muscle spasms for up to 7 days. 3/31/22 4/7/22  Fiorella Stafford DO   lidocaine (ASPERCREME) 4 % adhesive patch,medicated topical patch Apply 1 patch topically daily for 5 days. 3/31/22 4/5/22  Fiorella Stafford DO   naproxen (NAPROSYN)  500 mg tablet Take 1 tablet (500 mg total) by mouth 2 (two) times a day with meals for 5 days. 3/31/22 4/5/22  Fiorella Stafford DO   SUMAtriptan (IMITREX) 50 mg tablet Take 50 mg by mouth once as needed.   19   Provider, MD Danilo     Vitals:  Temp:  [36.1 °C (97 °F)] 36.1 °C (97 °F)  Heart Rate:  [88] 88  BP: (154)/(72) 154/72    Physical Exam:  General: AAOx3, NAD  Cardio: RRR  Pulm: CTAB  Breast: Deferred   Abd: Soft, non distended, non tender   : Normal external genitalia. SSE declined. Patient unable to tolerate bimanual exam.  Ext: No swelling bilaterally    Assessment/Plan:  Chrissy Tomlinson is a 37 y.o.  female who presents for annual exam.    1. Healthcare Maintenance: STI screening done today. Mammogram due in February. Blood pressure elevated today 154/72, denies history of hypertensions. Does not follow with a PCP. Referred to [facility] clinic today.     2. Abnormal uterine bleeding/Abdominal Pain: Patient declined full exam today. Reviewed that without a full exam it is difficult to identify etiology. Recommended pelvic U/S to which patient is agreeable. Will follow up in clinic once U/S is completed to follow up on results and symptoms accordingly.     Discussed with Dr. Елена Oates MD

## 2022-08-25 LAB
A VAGINAE DNA VAG NAA+PROBE-LOG#: 7.7 LOG (CELLS/ML)
BV SCORE VAG QL NAA+PROBE: ABNORMAL
C GLABRATA DNA VAG QL NAA+PROBE: NOT DETECTED
C PARAP DNA VAG QL NAA+PROBE: NOT DETECTED
C TRACH RRNA SPEC QL NAA+PROBE: NOT DETECTED
C TROPICLS DNA VAG QL NAA+PROBE: NOT DETECTED
CANDIDA DNA VAG QL NAA+PROBE: NOT DETECTED
G VAGINALIS DNA VAG NAA+PROBE-LOG#: >8 LOG (CELLS/ML)
LACTOBACILLUS DNA VAG NAA+PROBE-LOG#: NOT DETECTED LOG CELLS/ML
MEGASPHAERA SP DNA VAG NAA+PROBE-LOG#: 8 LOG (CELLS/ML)
N GONORRHOEA RRNA SPEC QL NAA+PROBE: NOT DETECTED
T VAGINALIS RRNA SPEC QL NAA+PROBE: NOT DETECTED

## 2023-09-22 ENCOUNTER — OFFICE VISIT (OUTPATIENT)
Dept: OBSTETRICS AND GYNECOLOGY | Facility: CLINIC | Age: 38
End: 2023-09-22
Payer: COMMERCIAL

## 2023-09-22 VITALS
SYSTOLIC BLOOD PRESSURE: 144 MMHG | HEART RATE: 89 BPM | DIASTOLIC BLOOD PRESSURE: 88 MMHG | WEIGHT: 209.5 LBS | HEIGHT: 61 IN | TEMPERATURE: 97.5 F | OXYGEN SATURATION: 98 % | BODY MASS INDEX: 39.55 KG/M2

## 2023-09-22 DIAGNOSIS — Z11.3 SCREENING EXAMINATION FOR VENEREAL DISEASE: ICD-10-CM

## 2023-09-22 DIAGNOSIS — N89.8 VAGINAL DISCHARGE: Primary | ICD-10-CM

## 2023-09-22 DIAGNOSIS — Z11.3 SCREENING EXAMINATION FOR SEXUALLY TRANSMITTED DISEASE: ICD-10-CM

## 2023-09-22 LAB
EXPIRATION DATE: NORMAL
Lab: NORMAL
POCT MANUFACTURER: NORMAL
PREGNANCY TEST URINE, POC: NEGATIVE

## 2023-09-22 PROCEDURE — 99999 PR OFFICE/OUTPT VISIT,PROCEDURE ONLY: CPT | Performed by: ADVANCED PRACTICE MIDWIFE

## 2023-09-22 PROCEDURE — 81025 URINE PREGNANCY TEST: CPT | Performed by: ADVANCED PRACTICE MIDWIFE

## 2023-09-22 ASSESSMENT — PAIN SCALES - GENERAL: PAINLEVEL: 0-NO PAIN

## 2023-09-22 NOTE — PROGRESS NOTES
38 y.o. complains of vaginal discharge for past several days that has no odor, itch or vaginal irriation.  Also requesting UPT today which is negative.  Is due for annual visit.    Impression:    Problem List Items Addressed This Visit    None  Visit Diagnoses     Vaginal discharge    -  Primary    Relevant Orders    SURESWAB(R) ADVANCED VAGINITIS PLUS, TMA    Screening examination for venereal disease        Relevant Orders    SURESWAB(R) ADVANCED VAGINITIS PLUS, TMA          Plan:   SureSwab collected, treat with results as indicated.   RTO for annual visit.

## 2023-09-23 LAB
BV BACTERIA RRNA VAG QL NAA+PROBE: POSITIVE
C GLABRATA RNA VAG QL NAA+PROBE: NOT DETECTED
C TRACH RRNA SPEC QL NAA+PROBE: NOT DETECTED
CANDIDA RRNA VAG QL PROBE: NOT DETECTED
N GONORRHOEA RRNA SPEC QL NAA+PROBE: NOT DETECTED
T VAGINALIS RRNA SPEC QL NAA+PROBE: NOT DETECTED

## 2023-09-25 ENCOUNTER — TELEPHONE (OUTPATIENT)
Dept: OBSTETRICS AND GYNECOLOGY | Facility: CLINIC | Age: 38
End: 2023-09-25
Payer: COMMERCIAL

## 2023-09-25 RX ORDER — METRONIDAZOLE 500 MG/1
500 TABLET ORAL 2 TIMES DAILY
Qty: 14 TABLET | Refills: 1 | Status: SHIPPED | OUTPATIENT
Start: 2023-09-25 | End: 2023-10-02

## 2023-09-26 ENCOUNTER — TELEPHONE (OUTPATIENT)
Dept: OBSTETRICS AND GYNECOLOGY | Facility: CLINIC | Age: 38
End: 2023-09-26
Payer: COMMERCIAL

## 2023-09-26 RX ORDER — METRONIDAZOLE 500 MG/1
500 TABLET ORAL 2 TIMES DAILY
Qty: 14 TABLET | Refills: 1 | Status: SHIPPED | OUTPATIENT
Start: 2023-09-26 | End: 2023-10-03

## 2024-03-13 ENCOUNTER — OFFICE VISIT (OUTPATIENT)
Dept: OBSTETRICS AND GYNECOLOGY | Facility: CLINIC | Age: 39
End: 2024-03-13
Payer: COMMERCIAL

## 2024-03-13 VITALS
OXYGEN SATURATION: 97 % | HEIGHT: 61 IN | WEIGHT: 211.7 LBS | BODY MASS INDEX: 39.97 KG/M2 | TEMPERATURE: 97.7 F | DIASTOLIC BLOOD PRESSURE: 85 MMHG | HEART RATE: 87 BPM | SYSTOLIC BLOOD PRESSURE: 170 MMHG

## 2024-03-13 DIAGNOSIS — N73.0 PID (ACUTE PELVIC INFLAMMATORY DISEASE): Primary | ICD-10-CM

## 2024-03-13 DIAGNOSIS — N73.9 PELVIC INFLAMMATORY DISEASE: Primary | ICD-10-CM

## 2024-03-13 PROCEDURE — 200200 PR NO CHARGE

## 2024-03-13 PROCEDURE — 3008F BODY MASS INDEX DOCD: CPT

## 2024-03-13 PROCEDURE — 87210 SMEAR WET MOUNT SALINE/INK: CPT

## 2024-03-13 PROCEDURE — 99214 OFFICE O/P EST MOD 30 MIN: CPT | Mod: 25

## 2024-03-13 PROCEDURE — 96372 THER/PROPH/DIAG INJ SC/IM: CPT

## 2024-03-13 RX ORDER — DOXYCYCLINE 100 MG/1
100 CAPSULE ORAL 2 TIMES DAILY
Qty: 28 CAPSULE | Refills: 0 | Status: SHIPPED | OUTPATIENT
Start: 2024-03-13 | End: 2024-03-27

## 2024-03-13 RX ORDER — MICONAZOLE NITRATE 2 %
1 CREAM WITH APPLICATOR VAGINAL NIGHTLY
Qty: 45 G | Refills: 0 | Status: SHIPPED | OUTPATIENT
Start: 2024-03-13 | End: 2024-03-20

## 2024-03-13 RX ORDER — LIDOCAINE HYDROCHLORIDE 10 MG/ML
2 INJECTION, SOLUTION INFILTRATION; PERINEURAL ONCE
Status: COMPLETED | OUTPATIENT
Start: 2024-03-13 | End: 2024-03-13

## 2024-03-13 RX ORDER — METRONIDAZOLE 500 MG/1
500 TABLET ORAL 2 TIMES DAILY
Qty: 28 TABLET | Refills: 0 | Status: SHIPPED | OUTPATIENT
Start: 2024-03-13 | End: 2024-03-27

## 2024-03-13 RX ORDER — METRONIDAZOLE 500 MG/1
500 TABLET ORAL 2 TIMES DAILY
Qty: 14 TABLET | Refills: 0 | Status: CANCELLED | OUTPATIENT
Start: 2024-03-13 | End: 2024-03-20

## 2024-03-13 RX ORDER — DOXYCYCLINE 100 MG/1
100 CAPSULE ORAL 2 TIMES DAILY
Qty: 14 CAPSULE | Refills: 0 | Status: CANCELLED | OUTPATIENT
Start: 2024-03-13 | End: 2024-03-20

## 2024-03-13 RX ORDER — CEFTRIAXONE 250 MG/1
500 INJECTION, POWDER, FOR SOLUTION INTRAMUSCULAR; INTRAVENOUS ONCE
Status: COMPLETED | OUTPATIENT
Start: 2024-03-13 | End: 2024-03-13

## 2024-03-13 RX ADMIN — CEFTRIAXONE 500 MG: 250 INJECTION, POWDER, FOR SOLUTION INTRAMUSCULAR; INTRAVENOUS at 16:46

## 2024-03-13 RX ADMIN — LIDOCAINE HYDROCHLORIDE 2 ML: 10 INJECTION, SOLUTION INFILTRATION; PERINEURAL at 16:47

## 2024-03-13 ASSESSMENT — PAIN SCALES - GENERAL: PAINLEVEL: 10-WORST PAIN EVER

## 2024-03-13 NOTE — ASSESSMENT & PLAN NOTE
Doxycyline 100 mg BID x 14 days  Metronidazole 500 BID x 14 days  Ceftriaxone  500 mg IM x 1  If positive for Mycoplasma will add Moxifloxacin  Follow up by phone with patient in 1 week

## 2024-03-13 NOTE — PROGRESS NOTES
"38 yo female  presents today for a complaint of pelvic pain    HPI:  Reports that she began to notice discomfort in her lower abdomen in late August .   She also reported a change in her vaginal discharge  Sought out care at the office  In September of last year but collected a self-swab and did not have an exam  She was diagnosed from her  swab with BV and was treated with Metronidazole ( 7 days)  Between October and February her pelvic worsened and spread to her lower back . Reports at worst wakes her from sleep and causes her to double over in pain. At best is a like a \" slow simmer\" and is never really totally gone      Review of Systems  Fever/Chills:No   Abdominal Pain: Yes   Breast Symptoms:N/A  Pelvic Pain: Yes  Urinary symptoms: No     Dyspareunia: not applicable ( not smexually active since September)  Vaginal Discharge:Yes , thin in character ( watery)  and is brownish in color  Pruritus:absent  Vaginal Odor: absent.     Past Medical History:   Diagnosis Date   • Asthma    • Migraines    • Seasonal allergies      Past Surgical History:   Procedure Laterality Date   • DIAGNOSTIC LAPAROSCOPY       Allergies:   Allergies   Allergen Reactions   • Latex Hives   • Pollen Extracts      Current Medications:   Current Outpatient Medications on File Prior to Visit   Medication Sig Dispense Refill   • albuterol HFA (VENTOLIN HFA) 90 mcg/actuation inhaler Inhale 2 puffs as needed.       • azithromycin (ZITHROMAX) 250 mg tablet take 2 tablets by mouth on day 1 IN ONE DOSE then 1 tablet on days 2 through 5     • cetirizine (ZYRTEC) 10 mg capsule Take 10 mg by mouth daily.     • fluticasone propionate (FLONASE) 50 mcg/actuation nasal spray Administer 1 spray into each nostril daily as needed.       • SUMAtriptan (IMITREX) 50 mg tablet Take 50 mg by mouth once as needed.       • cyclobenzaprine (FLEXERIL) 10 mg tablet Take 1 tablet (10 mg total) by mouth 2 (two) times a day as needed for muscle spasms for up " "to 7 days. 10 tablet 0   • lidocaine (ASPERCREME) 4 % adhesive patch,medicated topical patch Apply 1 patch topically daily for 5 days. 30 patch 0   • naproxen (NAPROSYN) 500 mg tablet Take 1 tablet (500 mg total) by mouth 2 (two) times a day with meals for 5 days. 20 tablet 0     No current facility-administered medications on file prior to visit.       Exam:  Vitals:    03/13/24 1558   BP: (!) 170/85   BP Location: Left upper arm   Patient Position: Sitting   Pulse: 87   Temp: 36.5 °C (97.7 °F)   TempSrc: Temporal   SpO2: 97%   Weight: 96 kg (211 lb 11.2 oz)   Height: 1.549 m (5' 1\")     GEN: AOx3, Cooperative, in NAD.   HEENT: normocephalic,  atraumatic   RESPIRATORY: breathing unlabored Yes, no tachypnea noted  CARDIAC:  edema absent  Abdomen is soft, non tender , tenderness in suprapubic area. Flank pain:absent  Ext Gen: appears normal Yes   Vagina does appear normal. Comment patient was only able to tolerate a pediatric speculum due incomplete visualization  Cervix: parous Abnormal findings cervix: present tenderness with movement  Uterus: mobile, mild fundal tenderness, enlarged: No  position middle  Adnexa: are tender ( on right)  and  Not tender on the left. No masses  Appreciated{  Rectal: deferred :Yes     Specimens: vaginitis panel and mycoplasma panel. Vaginal fluid for salien and KOH microscopy  Vaginal pH= 5.0  Saline: moderate PMN's, some Clue cells. No motile organisms  KOH: clumps of budding yeast noted    Problem List Items Addressed This Visit     Pelvic inflammatory disease - Primary    Overview     Chronic pelvic pain which  Has increased since September / October of 2023  Meets minimum criteria for PID  Suspect anaerobe as causative organism         Current Assessment & Plan     Doxycyline 100 mg BID x 14 days  Metronidazole 500 BID x 14 days  Ceftriaxone  500 mg IM x 1  If positive for Mycoplasma will add Moxifloxacin  Follow up by phone with patient in 1 week         Relevant Medications    " metroNIDAZOLE (FLAGYL) 500 mg tablet    doxycycline hyclate (VIBRAMYCIN) 100 mg capsule    Other Relevant Orders    SureSwab®, Mycoplasma/Ureaplasma Panel, PCR    SureSwab®, Advanced Vaginitis Plus, TMA         RTC:  1-2 weeks

## 2024-03-15 LAB
BV BACTERIA RRNA VAG QL NAA+PROBE: NEGATIVE
C GLABRATA RNA VAG QL NAA+PROBE: NOT DETECTED
C TRACH RRNA SPEC QL NAA+PROBE: NOT DETECTED
CANDIDA RRNA VAG QL PROBE: NOT DETECTED
M GENITALIUM RRNA SPEC QL NAA+PROBE: NOT DETECTED
M HOMINIS DNA SPEC QL NAA+PROBE: NOT DETECTED
N GONORRHOEA RRNA SPEC QL NAA+PROBE: NOT DETECTED
T VAGINALIS RRNA SPEC QL NAA+PROBE: NOT DETECTED
U PARVUM DNA SPEC QL NAA+PROBE: NOT DETECTED
U UREALYTICUM DNA SPEC QL NAA+PROBE: NOT DETECTED

## 2024-03-18 DIAGNOSIS — R10.10 PAIN OF UPPER ABDOMEN: Primary | ICD-10-CM

## 2024-03-18 NOTE — PROGRESS NOTES
Called patient on day 5 of antibiotics for PID. She does not feel any better and now has nausea and vomiting.  I advised her that if her pain was pelvic in origin, I would suspect that she would be improving ( at least a little bit) by this time.    Ok to stop medication. Possible alternative diagnosis as she has other GI symptoms ( worse with eating, constipation  And a family history of IBD    Will refer to GI for consult, and possible barium study, colonoscolpy    Diagnoses and all orders for this visit:    Pain of upper abdomen  -     Ambulatory referral to Gastroenterology; Future

## 2024-08-02 ENCOUNTER — APPOINTMENT (RX ONLY)
Dept: URBAN - METROPOLITAN AREA CLINIC 28 | Facility: CLINIC | Age: 39
Setting detail: DERMATOLOGY
End: 2024-08-02

## 2024-08-02 DIAGNOSIS — L73.2 HIDRADENITIS SUPPURATIVA: ICD-10-CM | Status: WORSENING

## 2024-08-02 PROCEDURE — ? PRESCRIPTION

## 2024-08-02 PROCEDURE — ? COUNSELING

## 2024-08-02 PROCEDURE — ? PRESCRIPTION MEDICATION MANAGEMENT

## 2024-08-02 PROCEDURE — 99214 OFFICE O/P EST MOD 30 MIN: CPT

## 2024-08-02 RX ORDER — BENZOYL PEROXIDE 100 MG/G
LOTION TOPICAL QDAY
Qty: 237 | Refills: 11 | Status: ERX

## 2024-08-02 RX ORDER — CLINDAMYCIN PHOSPHATE 10 MG/ML
SOLUTION TOPICAL QDAY
Qty: 60 | Refills: 11 | Status: ERX

## 2024-08-02 RX ORDER — DOXYCYCLINE 100 MG/1
CAPSULE ORAL QDAY
Qty: 30 | Refills: 1 | Status: ERX

## 2024-08-02 ASSESSMENT — LOCATION SIMPLE DESCRIPTION DERM
LOCATION SIMPLE: RIGHT AXILLARY VAULT
LOCATION SIMPLE: RIGHT BREAST
LOCATION SIMPLE: GROIN
LOCATION SIMPLE: LEFT AXILLARY VAULT

## 2024-08-02 ASSESSMENT — LOCATION DETAILED DESCRIPTION DERM
LOCATION DETAILED: LEFT SUPRAPUBIC SKIN
LOCATION DETAILED: RIGHT AXILLARY VAULT
LOCATION DETAILED: RIGHT MEDIAL BREAST 3-4:00 REGION
LOCATION DETAILED: RIGHT SUPRAPUBIC SKIN
LOCATION DETAILED: LEFT AXILLARY VAULT

## 2024-08-02 ASSESSMENT — LOCATION ZONE DERM
LOCATION ZONE: TRUNK
LOCATION ZONE: AXILLAE

## 2024-08-02 NOTE — PROCEDURE: PRESCRIPTION MEDICATION MANAGEMENT
Detail Level: Zone
Render In Strict Bullet Format?: No
Initiate Treatment: (restart)\\nbenzoyl peroxide 10% topical cleanser: Wash AA of body QDAY during shower\\nclindamycin phosphate 1% topical swab: Wipe AA of body after shower QDAY\\ndoxycycline monohydrate 100mg capsule: Take one capsule BID PO with food and a full glass of water, do not lay down after taking

## (undated) DEVICE — CANNULA TWIST-IN 7MM X 7CM

## (undated) DEVICE — SPONGE GAUZE 4X4 4 PLY-2S

## (undated) DEVICE — Device

## (undated) DEVICE — PUDDLE-VAC

## (undated) DEVICE — TUBE SUCTION 1/4INX20FT STERILE

## (undated) DEVICE — TUBING PATIENT ARTHROSCOPY REDEUCE

## (undated) DEVICE — ELECTRODE COOLPULSE 90

## (undated) DEVICE — SALINE .9% 3 LITER BAG

## (undated) DEVICE — FACEMASK FOR SHOULDER POSITONER

## (undated) DEVICE — MANIFOLD FOUR PORT NEPTUNE

## (undated) DEVICE — PACK MLHS SHOULDER PACK RFID STDZ

## (undated) DEVICE — GOWN SURG X-LARGE MICROCOOL

## (undated) DEVICE — APPLICATOR CHLORAPREP 26ML ORANGE TINT

## (undated) DEVICE — SLING ARM ECONO LARGE

## (undated) DEVICE — SUTURE ETHILON  3-0   663H

## (undated) DEVICE — TUBING PUMP ARTHROSCOPY REDEUCE

## (undated) DEVICE — DRESSING TEGADERM 4X4 3/4

## (undated) DEVICE — MATS FLOOR ABSORBE YELLOW 36IN X 40IN

## (undated) DEVICE — TUBING SMOKE EVAC PENCIL COATED

## (undated) DEVICE — PROBE RF 90-S CRUISE 4.0 MM